# Patient Record
Sex: FEMALE | Race: WHITE | Employment: OTHER | URBAN - NONMETROPOLITAN AREA
[De-identification: names, ages, dates, MRNs, and addresses within clinical notes are randomized per-mention and may not be internally consistent; named-entity substitution may affect disease eponyms.]

---

## 2017-08-22 ENCOUNTER — HOSPITAL ENCOUNTER (EMERGENCY)
Facility: HOSPITAL | Age: 52
Discharge: HOME | End: 2017-08-22
Attending: PHYSICIAN ASSISTANT | Admitting: PHYSICIAN ASSISTANT
Payer: COMMERCIAL

## 2017-08-22 VITALS
OXYGEN SATURATION: 100 % | SYSTOLIC BLOOD PRESSURE: 139 MMHG | DIASTOLIC BLOOD PRESSURE: 82 MMHG | RESPIRATION RATE: 20 BRPM | TEMPERATURE: 98 F | HEART RATE: 78 BPM

## 2017-08-22 DIAGNOSIS — T74.21XA SEXUAL ASSAULT OF ADULT, INITIAL ENCOUNTER: ICD-10-CM

## 2017-08-22 PROCEDURE — 25000128 H RX IP 250 OP 636: Performed by: PHYSICIAN ASSISTANT

## 2017-08-22 PROCEDURE — 99283 EMERGENCY DEPT VISIT LOW MDM: CPT | Performed by: PHYSICIAN ASSISTANT

## 2017-08-22 PROCEDURE — 25000125 ZZHC RX 250: Performed by: PHYSICIAN ASSISTANT

## 2017-08-22 PROCEDURE — 99284 EMERGENCY DEPT VISIT MOD MDM: CPT | Mod: 25

## 2017-08-22 PROCEDURE — 25000132 ZZH RX MED GY IP 250 OP 250 PS 637: Performed by: PHYSICIAN ASSISTANT

## 2017-08-22 PROCEDURE — 96372 THER/PROPH/DIAG INJ SC/IM: CPT

## 2017-08-22 RX ORDER — METRONIDAZOLE 500 MG/1
2000 TABLET ORAL ONCE
Status: COMPLETED | OUTPATIENT
Start: 2017-08-22 | End: 2017-08-22

## 2017-08-22 RX ORDER — AZITHROMYCIN 250 MG/1
1000 TABLET, FILM COATED ORAL ONCE
Status: COMPLETED | OUTPATIENT
Start: 2017-08-22 | End: 2017-08-22

## 2017-08-22 RX ORDER — CEFTRIAXONE SODIUM 1 G
250 VIAL (EA) INJECTION ONCE
Status: COMPLETED | OUTPATIENT
Start: 2017-08-22 | End: 2017-08-22

## 2017-08-22 RX ORDER — ONDANSETRON 4 MG/1
4 TABLET, ORALLY DISINTEGRATING ORAL ONCE
Status: COMPLETED | OUTPATIENT
Start: 2017-08-22 | End: 2017-08-22

## 2017-08-22 RX ADMIN — AZITHROMYCIN 1000 MG: 250 TABLET, FILM COATED ORAL at 13:51

## 2017-08-22 RX ADMIN — METRONIDAZOLE 2000 MG: 500 TABLET, FILM COATED ORAL at 13:48

## 2017-08-22 RX ADMIN — ONDANSETRON 4 MG: 4 TABLET, ORALLY DISINTEGRATING ORAL at 13:27

## 2017-08-22 RX ADMIN — CEFTRIAXONE SODIUM 250 MG: 1 INJECTION, POWDER, FOR SOLUTION INTRAMUSCULAR; INTRAVENOUS at 13:36
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
## 2017-08-22 NOTE — ED AVS SNAPSHOT
[facility]    [address] [address]    Phone:  [phone]                                       Josephine Liu   MRN: [id number]    Department[facility]  [facility]   Date of Visit:  8/22/2017           After Visit Summary Signature Page     I have received my discharge instructions, and my questions have been answered. I have discussed any challenges I see with this plan with the nurse or doctor.    ..........................................................................................................................................  Patient/Patient Representative Signature      ..........................................................................................................................................  Patient Representative Print Name and Relationship to Patient    ..................................................               ................................................  Date                                            Time    ..........................................................................................................................................  Reviewed by Signature/Title    ...................................................              ..............................................  Date                                                            Time
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
## 2017-08-23 NOTE — ED NOTES
Patient called at 1330 today with questions regarding the discharge medications she was given yesterday and requesting zofran 4 mg ODT. Contacted Avril Dominguez for questions on her medication. Mason Puckett called a prescription in to [facility] [place] as pt requested. Zofran ODT 4mg; one tab every 6 hours prn for nausea. Total of 15 tabs.MARA JENKINS. Patient contacted to inform med was ordered for her and would be ready in 20 minutes. Pt instructed to not drink ETOH for 72 hours after taking the flagyl per instructions from Arti, nicholas.

## 2018-02-11 ENCOUNTER — HOSPITAL ENCOUNTER (EMERGENCY)
Facility: HOSPITAL | Age: 53
Discharge: HOME | End: 2018-02-11
Attending: NURSE PRACTITIONER | Admitting: NURSE PRACTITIONER
Payer: COMMERCIAL

## 2018-02-11 VITALS
RESPIRATION RATE: 20 BRPM | SYSTOLIC BLOOD PRESSURE: 147 MMHG | DIASTOLIC BLOOD PRESSURE: 84 MMHG | OXYGEN SATURATION: 99 % | HEART RATE: 95 BPM | TEMPERATURE: 99.9 F

## 2018-02-11 DIAGNOSIS — R07.0 THROAT PAIN: ICD-10-CM

## 2018-02-11 DIAGNOSIS — J10.1 INFLUENZA A: ICD-10-CM

## 2018-02-11 LAB
DEPRECATED S PYO AG THROAT QL EIA: NORMAL
FLUAV+FLUBV AG SPEC QL: NEGATIVE
FLUAV+FLUBV AG SPEC QL: POSITIVE
SPECIMEN SOURCE: ABNORMAL
SPECIMEN SOURCE: NORMAL

## 2018-02-11 PROCEDURE — 87081 CULTURE SCREEN ONLY: CPT | Performed by: NURSE PRACTITIONER

## 2018-02-11 PROCEDURE — G0463 HOSPITAL OUTPT CLINIC VISIT: HCPCS

## 2018-02-11 PROCEDURE — 99203 OFFICE O/P NEW LOW 30 MIN: CPT | Performed by: NURSE PRACTITIONER

## 2018-02-11 PROCEDURE — 87804 INFLUENZA ASSAY W/OPTIC: CPT | Performed by: FAMILY MEDICINE

## 2018-02-11 PROCEDURE — 87880 STREP A ASSAY W/OPTIC: CPT | Performed by: NURSE PRACTITIONER

## 2018-02-11 RX ORDER — LORATADINE 10 MG/1
10 TABLET ORAL DAILY
COMMUNITY
End: 2024-06-12

## 2018-02-11 RX ORDER — AMOXICILLIN 500 MG/1
500 CAPSULE ORAL 2 TIMES DAILY
Qty: 20 CAPSULE | Refills: 0 | Status: SHIPPED | OUTPATIENT
Start: 2018-02-11 | End: 2018-02-21

## 2018-02-11 RX ORDER — OSELTAMIVIR PHOSPHATE 75 MG/1
75 CAPSULE ORAL 2 TIMES DAILY
Qty: 10 CAPSULE | Refills: 0 | Status: SHIPPED | OUTPATIENT
Start: 2018-02-11 | End: 2018-02-16

## 2018-02-11 NOTE — ED AVS SNAPSHOT
[facility]    [address] [address]    Phone:  [phone]                                       Josephine Liu   MRN: [id number]    Department:  [facility]   Date of Visit:  2/11/2018           Patient Information     Date Of Birth          1965        Your diagnoses for this visit were:     Influenza A     Throat pain        You were seen by Zulma Schmitz NP.      Follow-up Information     Follow up with Babs Davila NP.    Specialty:  Nurse Practitioner    Why:  As needed, If symptoms worsen    Contact information:    [facility]  [address] [facility] [address] [phone]          Follow up with [facility].    Specialty:  EMERGENCY MEDICINE    Why:  As needed, If symptoms worsen    Contact information:    [address] [address] [phone]    Additional information:    From [place] Area: Take [address]. Turn left at [address]. Turn left at the first stoplight on [address]. At the first stop sign, take a right onto [address]. Take a left into the parking lot and continue through until you reach the North enterance of the building.       From [place]: Take [address]. Take the [address] ramp towards [place]. Turn left onto [address]. Take a slight right onto [address]. Turn left at the first stoplight on [address]. At the first stop sign, take a right onto [address]. Take a left into the parking lot and continue through until you reach the North enterance of the building.       From [place]: Take [address]. Take a right at [address]. At the first stop sign, take a right onto [address]. Take a left into the parking lot and continue through until you reach the North enterance of the building.         Discharge Instructions       Take antibiotics as directed.   Eat a yogurt daily while taking  antibiotics.   Take tamiflu as ordered.   Take tylenol and/or ibuprofen for pain or fever. Follow dosing on package.   Increase fluid intake.   Throw tooth brush out in 5 days.   Follow up with PCP with any increase in symptoms or concerns.   Return to urgent care or emergency department with any increase in symptoms or concerns.     Discharge References/Attachments     (ADULT), INFLUENZA (ENGLISH)    SORE THROATS, SELF-CARE FOR (ENGLISH)    SORE THROAT, WHEN YOU HAVE A (ENGLISH)    PHARYNGITIS, STREP (PRESUMED) (ENGLISH)         Review of your medicines      START taking        Dose / Directions Last dose taken    amoxicillin 500 MG capsule   Commonly known as:  AMOXIL   Dose:  500 mg   Quantity:  20 capsule        Take 1 capsule (500 mg) by mouth 2 times daily for 10 days   Refills:  0        oseltamivir 75 MG capsule   Commonly known as:  TAMIFLU   Dose:  75 mg   Quantity:  10 capsule        Take 1 capsule (75 mg) by mouth 2 times daily for 5 days   Refills:  0          Our records show that you are taking the medicines listed below. If these are incorrect, please call your family doctor or clinic.        Dose / Directions Last dose taken    biotin 2.5 mg/mL Susp   Dose:  5000 mg        Take 5,000 mg by mouth daily   Refills:  0        budesonide 32 MCG/ACT spray   Commonly known as:  RINOCORT AQUA   Dose:  1 spray        Spray 1 spray into both nostrils daily   Refills:  0        cyclobenzaprine 10 MG tablet   Commonly known as:  FLEXERIL   Dose:  10 mg   Quantity:  3 tablet        Take 1 tablet (10 mg) by mouth 3 times daily as needed for muscle spasms   Refills:  0        CYMBALTA PO   Dose:  60 mg        Take 60 mg by mouth 2 times daily   Refills:  0        FUROSEMIDE PO   Dose:  20 mg        Take 20 mg by mouth daily as needed   Refills:  0        LAMOTRIGINE PO   Dose:  25 mg        Take 25 mg by mouth 2 times daily   Refills:  0        LEVOTHYROXINE SODIUM PO   Dose:  150 mcg        Take 150 mcg by mouth  daily   Refills:  0        loratadine 10 MG tablet   Commonly known as:  CLARITIN   Dose:  10 mg        Take 10 mg by mouth daily   Refills:  0        MAGNESIUM PO   Dose:  1 tablet        Take 1 tablet by mouth daily   Refills:  0        MINOCYCLINE HCL PO   Dose:  100 mg        Take 100 mg by mouth daily   Refills:  0        ROPINIROLE HCL PO   Dose:  0.25 mg        Take 0.25 mg by mouth At Bedtime   Refills:  0        TRAZODONE HCL PO   Dose:  100 mg        Take 100 mg by mouth At Bedtime   Refills:  0        VITAMIN D (CHOLECALCIFEROL) PO   Dose:  56261 Units        Take 10,000 Units by mouth daily   Refills:  0                Prescriptions were sent or printed at these locations (2 Prescriptions)                   [facility] [id number] - [place], [place] - [address] AT [facility] [address]   [address], [place] [address]    Telephone:  [phone]   Fax:  [phone]   Hours:                  E-Prescribed (2 of 2)         oseltamivir (TAMIFLU) 75 MG capsule               amoxicillin (AMOXIL) 500 MG capsule                Procedures and tests performed during your visit     Beta strep group A culture    Influenza A/B antigen    Rapid strep screen      Orders Needing Specimen Collection     None      Pending Results     Date and Time Order Name Status Description    2/11/2018 1511 Beta strep group A culture In process             Pending Culture Results     Date and Time Order Name Status Description    2/11/2018 1511 Beta strep group A culture In process             Thank you for choosing [facility]       Thank you for choosing [facility] for your care. Our goal is always to provide you with excellent care. Hearing back from our patients is one way we can continue to improve our services. Please take a few minutes to complete the written survey that you may receive in the mail after you visit with us. Thank you!        [employer] Information     [employer] lets you send messages to your doctor, view your  "test results, renew your prescriptions, schedule appointments and more. To sign up, go to [email or web address] . Click on \"Log in\" on the left side of the screen, which will take you to the Welcome page. Then click on \"Sign up Now\" on the right side of the page.     You will be asked to enter the access code listed below, as well as some personal information. Please follow the directions to create your username and password.     Your access code is: [email or web address]  Expires: 2018  3:35 PM     Your access code will  in 90 days. If you need help or a new code, please call your [facility] clinic or [phone].        Care EveryWhere ID     This is your Care EveryWhere ID. This could be used by other organizations to access your [facility] medical records  [id number]        Equal Access to Services     [facility] Hospital[facility] : Hadestrella Taylor, lisa yoo, casey corbettalshellie bernard, austyn plummer . So [facility] [phone].    ATENCIÓN: Si habla español, tiene a handy disposición servicios gratuitos de asistencia lingüística. Llame al [phone].    We comply with applicable federal civil rights laws and [place] laws. We do not discriminate on the basis of race, color, national origin, age, disability, sex, sexual orientation, or gender identity.            After Visit Summary       This is your record. Keep this with you and show to your community pharmacist(s) and doctor(s) at your next visit.                  "

## 2018-02-11 NOTE — ED NOTES
Pt given verbal and written d/c instructions and pt verbalizes understanding. Pt advised that prescriptions for amoxicillin and tamiflu have been sent to [facility]. Pt left department ambulatory.

## 2018-02-11 NOTE — ED PROVIDER NOTES
History     Chief Complaint   Patient presents with     Flu Symptoms     The history is provided by the patient. No  was used.     Josephine Liu is a 53 year old female who presents with a sore throat, fever, chills, cough, and fatigue that started 1 day ago. She's taken benadryl, ibuprofen, and nasal spray with mild effectiveness. Drinking well. Decreased appetite. Bowel and bladder are working well. No antibiotic use in the past 30 days. She is a non tobacco user.     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     Problem List:    Patient Active Problem List    Diagnosis Date Noted     ACP (advance care planning) 05/20/2015     Priority: Medium     Advance Care Planning 5/20/2015: Receipt of ACP document:  Received: Health Care Directive which was witnessed or notarized on 2-13-15.  Document previously scanned on 3-18-15.  Validation form completed and sent to be scanned.  Code Status needs to be updated to reflect choices in most recent ACP document.  Confirmed/documented designated decision maker(s).  Added by Mar Sims RN, System ACP Coordinator Honoring Choices              Gastric bypass status for obesity      Priority: Medium        Past Medical History:    Past Medical History:   Diagnosis Date     Gastric bypass status for obesity        Past Surgical History:    History reviewed. No pertinent surgical history.    Family History:    No family history on file.    Social History:  Marital Status:   [2]  Social History   Substance Use Topics     Smoking status: Never Smoker     Smokeless tobacco: Never Used     Alcohol use Yes      Comment: weekends        Medications:      loratadine (CLARITIN) 10 MG tablet   MAGNESIUM PO   FUROSEMIDE PO   ROPINIROLE HCL PO   oseltamivir (TAMIFLU) 75 MG capsule   amoxicillin (AMOXIL) 500 MG capsule   MINOCYCLINE HCL PO   LEVOTHYROXINE SODIUM PO   LAMOTRIGINE PO   DULoxetine HCl (CYMBALTA PO)   TRAZODONE HCL PO   budesonide  (RINOCORT AQUA) 32 MCG/ACT nasal spray   VITAMIN D, CHOLECALCIFEROL, PO   biotin 2.5 mg/mL   cyclobenzaprine (FLEXERIL) 10 MG tablet         Review of Systems   Constitutional: Positive for appetite change, chills, fatigue and fever. Negative for activity change.   HENT: Positive for congestion and sore throat. Negative for ear discharge, ear pain, postnasal drip, rhinorrhea, sinus pain, sinus pressure, trouble swallowing and voice change.    Respiratory: Positive for cough. Negative for shortness of breath, wheezing and stridor.    Cardiovascular: Negative for chest pain.   Gastrointestinal: Negative for abdominal pain, diarrhea, nausea and vomiting.   Genitourinary: Negative for dysuria.   Skin: Negative for rash.   Neurological: Negative for dizziness, weakness, numbness and headaches.   Psychiatric/Behavioral: Negative.        Physical Exam   BP: 147/84  Pulse: 95  Temp: 99.9  F (37.7  C)  Resp: 20  SpO2: 99 %      Physical Exam   Constitutional: She is oriented to person, place, and time. She appears well-developed and well-nourished. No distress.   HENT:   Head: Normocephalic.   Right Ear: External ear normal.   Left Ear: External ear normal.   Mouth/Throat: Oropharyngeal exudate present.   Oropharynx with erythema and exudate. Tonsils enlarged +2.    Neck: Normal range of motion. Neck supple.   Cardiovascular: Normal rate, regular rhythm and normal heart sounds.    No murmur heard.  Pulmonary/Chest: Effort normal. No respiratory distress. She has no wheezes. She has no rales.   Abdominal: Soft. She exhibits no distension.   Musculoskeletal: Normal range of motion.   Lymphadenopathy:     She has cervical adenopathy.   Neurological: She is alert and oriented to person, place, and time. She exhibits normal muscle tone.   Skin: Skin is warm. No rash noted. She is not diaphoretic. No erythema.   Psychiatric: She has a normal mood and affect. Her behavior is normal.   Nursing note and vitals reviewed.      ED  Course     ED Course     Procedures    Labs Ordered and Resulted from Time of ED Arrival Up to the Time of Departure from the ED   INFLUENZA A/B ANTIGEN - Abnormal; Notable for the following:        Result Value    Influenza A Positive (*)     All other components within normal limits   RAPID STREP SCREEN   BETA STREP GROUP A CULTURE     Results for orders placed or performed during the hospital encounter of 02/11/18   Influenza A/B antigen   Result Value Ref Range    Influenza A/B Agn Specimen Nares     Influenza A Positive (A) NEG^Negative    Influenza B Negative NEG^Negative   Rapid strep screen   Result Value Ref Range    Specimen Description Throat     Rapid Strep A Screen       NEGATIVE: No Group A streptococcal antigen detected by immunoassay, await culture report.       Assessments & Plan (with Medical Decision Making)     Discussed plan of care. She verbalized understanding. All questions answered.     I have reviewed the nursing notes.    I have reviewed the findings, diagnosis, plan and need for follow up with the patient.  Discharged in stable condition.     New Prescriptions    AMOXICILLIN (AMOXIL) 500 MG CAPSULE    Take 1 capsule (500 mg) by mouth 2 times daily for 10 days    OSELTAMIVIR (TAMIFLU) 75 MG CAPSULE    Take 1 capsule (75 mg) by mouth 2 times daily for 5 days       Final diagnoses:   Influenza A   Throat pain     Take antibiotics as directed.   Eat a yogurt daily while taking antibiotics.   Take tamiflu as ordered.   Take tylenol and/or ibuprofen for pain or fever. Follow dosing on package.   Increase fluid intake.   Throw tooth brush out in 5 days.   Follow up with PCP with any increase in symptoms or concerns.   Return to urgent care or emergency department with any increase in symptoms or concerns.     MARCIE Wilkinson  2/11/2018  2:45 PM  [facility]       Zulma Schmitz NP  02/15/18 2012

## 2018-02-11 NOTE — ED NOTES
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 Pt notes respiratory symptoms started yesterday and fever started today.

## 2018-02-11 NOTE — ED NOTES
DATE:  2/11/2018   TIME OF RECEIPT FROM LAB:  4070  LAB TEST:  Influenza A   LAB VALUE:  Positive  RESULTS GIVEN WITH READ-BACK TO (PROVIDER):  Zulma Schmitz   TIME LAB VALUE REPORTED TO PROVIDER:   0176

## 2018-02-11 NOTE — DISCHARGE INSTRUCTIONS
Take antibiotics as directed.   Eat a yogurt daily while taking antibiotics.   Take tamiflu as ordered.   Take tylenol and/or ibuprofen for pain or fever. Follow dosing on package.   Increase fluid intake.   Throw tooth brush out in 5 days.   Follow up with PCP with any increase in symptoms or concerns.   Return to urgent care or emergency department with any increase in symptoms or concerns.

## 2018-02-11 NOTE — ED AVS SNAPSHOT
[facility]    [address] [address]    Phone:  [phone]                                       Josephine Liu   MRN: [id number]    Department[facility]  [facility]   Date of Visit:  2/11/2018           After Visit Summary Signature Page     I have received my discharge instructions, and my questions have been answered. I have discussed any challenges I see with this plan with the nurse or doctor.    ..........................................................................................................................................  Patient/Patient Representative Signature      ..........................................................................................................................................  Patient Representative Print Name and Relationship to Patient    ..................................................               ................................................  Date                                            Time    ..........................................................................................................................................  Reviewed by Signature/Title    ...................................................              ..............................................  Date                                                            Time

## 2018-02-13 LAB
BACTERIA SPEC CULT: NORMAL
SPECIMEN SOURCE: NORMAL

## 2018-02-15 ASSESSMENT — ENCOUNTER SYMPTOMS
NUMBNESS: 0
DIARRHEA: 0
WHEEZING: 0
SINUS PRESSURE: 0
WEAKNESS: 0
RHINORRHEA: 0
CHILLS: 1
FATIGUE: 1
TROUBLE SWALLOWING: 0
STRIDOR: 0
PSYCHIATRIC NEGATIVE: 1
SINUS PAIN: 0
APPETITE CHANGE: 1
SORE THROAT: 1
DYSURIA: 0
FEVER: 1
ABDOMINAL PAIN: 0
DIZZINESS: 0
ACTIVITY CHANGE: 0
VOMITING: 0
VOICE CHANGE: 0
SHORTNESS OF BREATH: 0
NAUSEA: 0
HEADACHES: 0
COUGH: 1

## 2018-04-18 NOTE — ED NOTES
Discharge instructions given to pt and .  Pt sent home with flagyl 2 gms. See note in MRI.  Pt ambulatory   no dysuria, no frequency, and no hematuria.

## 2020-02-05 ENCOUNTER — APPOINTMENT (OUTPATIENT)
Dept: OCCUPATIONAL MEDICINE | Facility: OTHER | Age: 55
End: 2020-02-05

## 2020-02-05 ENCOUNTER — OFFICE VISIT (OUTPATIENT)
Dept: FAMILY MEDICINE | Facility: OTHER | Age: 55
End: 2020-02-05

## 2020-02-05 DIAGNOSIS — Z53.9 ERRONEOUS ENCOUNTER--DISREGARD: Primary | ICD-10-CM

## 2020-02-05 PROCEDURE — 99499 UNLISTED E&M SERVICE: CPT

## 2020-02-05 PROCEDURE — 90746 HEPB VACCINE 3 DOSE ADULT IM: CPT

## 2020-02-17 ENCOUNTER — OFFICE VISIT (OUTPATIENT)
Dept: CHIROPRACTIC MEDICINE | Facility: OTHER | Age: 55
End: 2020-02-17
Attending: CHIROPRACTOR
Payer: COMMERCIAL

## 2020-02-17 DIAGNOSIS — M54.50 ACUTE BILATERAL LOW BACK PAIN WITHOUT SCIATICA: ICD-10-CM

## 2020-02-17 DIAGNOSIS — M99.02 SEGMENTAL AND SOMATIC DYSFUNCTION OF THORACIC REGION: ICD-10-CM

## 2020-02-17 DIAGNOSIS — M99.03 SEGMENTAL AND SOMATIC DYSFUNCTION OF LUMBAR REGION: Primary | ICD-10-CM

## 2020-02-17 PROCEDURE — 98940 CHIROPRACT MANJ 1-2 REGIONS: CPT | Mod: AT | Performed by: CHIROPRACTOR

## 2020-02-17 PROCEDURE — 99202 OFFICE O/P NEW SF 15 MIN: CPT | Mod: 25 | Performed by: CHIROPRACTOR

## 2020-02-17 NOTE — PROGRESS NOTES
Subjective Finding:    Chief compalint: Patient presents with:  Back Pain  , Pain Scale: 5/10, Intensity: dull, Duration: 1 weeks, Radiating: no.    Date of injury:    Activities that the pain restricts:   Home/household/hobbies/social activities: yes.  Work duties: yes.  Sleep: yes.  Makes symptoms better: rest.  Makes symptoms worse: activity.  Have you seen anyone else for the symptoms? no.  Work related: no.  Automobile related injury: no.    Objective and Assessment:    Posture Analysis:   High shoulder: left.  Head tilt: left.  High iliac crest: .  Head carriage: forward.  Thoracic Kyphosis: neutral.  Lumbar Lordosis: neutral.    Lumbar Range of Motion: flexion decreased, extension decreased, left lateral flexion decreased and right lateral flexion decreased.  Cervical Range of Motion: extension decreased, left lateral flexion decreased and left rotation decreased.  Thoracic Range of Motion: extension decreased.  Extremity Range of Motion: .    Palpation:   L spine tightness    Segmental dysfunction pre-treatment and treatment area: T6      L5    Assessment post-treatment:  Cervical: ROM increased.  Thoracic: ROM increased.  Lumbar: ROM increased.    Comments:   Complicating Factors: .    Plan / Procedure:    Treatment plan: PRN  Instructed patient: stretch.  Short term goals: reduce pain, increase ROM and increase joint flexibility.  Long term goals: restore normal function.  Prognosis: very good.

## 2020-02-20 ENCOUNTER — OFFICE VISIT (OUTPATIENT)
Dept: CHIROPRACTIC MEDICINE | Facility: OTHER | Age: 55
End: 2020-02-20
Attending: CHIROPRACTOR
Payer: COMMERCIAL

## 2020-02-20 DIAGNOSIS — M54.50 ACUTE BILATERAL LOW BACK PAIN WITHOUT SCIATICA: ICD-10-CM

## 2020-02-20 DIAGNOSIS — M99.03 SEGMENTAL AND SOMATIC DYSFUNCTION OF LUMBAR REGION: Primary | ICD-10-CM

## 2020-02-20 DIAGNOSIS — M99.02 SEGMENTAL AND SOMATIC DYSFUNCTION OF THORACIC REGION: ICD-10-CM

## 2020-02-20 PROCEDURE — 98940 CHIROPRACT MANJ 1-2 REGIONS: CPT | Mod: AT | Performed by: CHIROPRACTOR

## 2020-02-24 ENCOUNTER — OFFICE VISIT (OUTPATIENT)
Dept: CHIROPRACTIC MEDICINE | Facility: OTHER | Age: 55
End: 2020-02-24
Attending: CHIROPRACTOR
Payer: COMMERCIAL

## 2020-02-24 DIAGNOSIS — M54.50 ACUTE BILATERAL LOW BACK PAIN WITHOUT SCIATICA: ICD-10-CM

## 2020-02-24 DIAGNOSIS — M99.03 SEGMENTAL AND SOMATIC DYSFUNCTION OF LUMBAR REGION: Primary | ICD-10-CM

## 2020-02-24 DIAGNOSIS — M99.02 SEGMENTAL AND SOMATIC DYSFUNCTION OF THORACIC REGION: ICD-10-CM

## 2020-02-24 PROCEDURE — 98940 CHIROPRACT MANJ 1-2 REGIONS: CPT | Mod: AT | Performed by: CHIROPRACTOR

## 2020-02-27 ENCOUNTER — OFFICE VISIT (OUTPATIENT)
Dept: CHIROPRACTIC MEDICINE | Facility: OTHER | Age: 55
End: 2020-02-27
Attending: CHIROPRACTOR
Payer: COMMERCIAL

## 2020-02-27 DIAGNOSIS — M99.03 SEGMENTAL AND SOMATIC DYSFUNCTION OF LUMBAR REGION: Primary | ICD-10-CM

## 2020-02-27 DIAGNOSIS — M99.02 SEGMENTAL AND SOMATIC DYSFUNCTION OF THORACIC REGION: ICD-10-CM

## 2020-02-27 DIAGNOSIS — M54.50 ACUTE BILATERAL LOW BACK PAIN WITHOUT SCIATICA: ICD-10-CM

## 2020-02-27 PROCEDURE — 98940 CHIROPRACT MANJ 1-2 REGIONS: CPT | Mod: AT | Performed by: CHIROPRACTOR

## 2020-03-11 ENCOUNTER — APPOINTMENT (OUTPATIENT)
Dept: OCCUPATIONAL MEDICINE | Facility: OTHER | Age: 55
End: 2020-03-11

## 2020-03-11 PROCEDURE — 90746 HEPB VACCINE 3 DOSE ADULT IM: CPT

## 2020-08-05 ENCOUNTER — APPOINTMENT (OUTPATIENT)
Dept: OCCUPATIONAL MEDICINE | Facility: OTHER | Age: 55
End: 2020-08-05

## 2020-08-05 PROCEDURE — 90746 HEPB VACCINE 3 DOSE ADULT IM: CPT

## 2021-02-23 ENCOUNTER — OFFICE VISIT (OUTPATIENT)
Dept: CHIROPRACTIC MEDICINE | Facility: OTHER | Age: 56
End: 2021-02-23
Attending: CHIROPRACTOR
Payer: COMMERCIAL

## 2021-02-23 DIAGNOSIS — M99.03 SEGMENTAL AND SOMATIC DYSFUNCTION OF LUMBAR REGION: Primary | ICD-10-CM

## 2021-02-23 DIAGNOSIS — M54.50 ACUTE BILATERAL LOW BACK PAIN WITHOUT SCIATICA: ICD-10-CM

## 2021-02-23 DIAGNOSIS — M99.02 SEGMENTAL AND SOMATIC DYSFUNCTION OF THORACIC REGION: ICD-10-CM

## 2021-02-23 PROCEDURE — 98940 CHIROPRACT MANJ 1-2 REGIONS: CPT | Mod: AT | Performed by: CHIROPRACTOR

## 2021-02-23 PROCEDURE — 99212 OFFICE O/P EST SF 10 MIN: CPT | Mod: 25 | Performed by: CHIROPRACTOR

## 2021-02-25 NOTE — PROGRESS NOTES
Subjective Finding:    Chief compalint: Patient presents with:  Back Pain  , Pain Scale: 5/10, Intensity: dull, Duration: 1 weeks, Radiating: no.    Date of injury:    Activities that the pain restricts:   Home/household/hobbies/social activities: yes.  Work duties: yes.  Sleep: yes.  Makes symptoms better: rest.  Makes symptoms worse: activity.  Have you seen anyone else for the symptoms? no.  Work related: no.  Automobile related injury: no.    Objective and Assessment:    Posture Analysis:   High shoulder: left.  Head tilt: left.  High iliac crest: .  Head carriage: forward.  Thoracic Kyphosis: neutral.  Lumbar Lordosis: neutral.    Lumbar Range of Motion: flexion decreased, extension decreased, left lateral flexion decreased and right lateral flexion decreased.  Cervical Range of Motion: extension decreased, left lateral flexion decreased and left rotation decreased.  Thoracic Range of Motion: extension decreased.  Extremity Range of Motion: .    Palpation:   L spine tightness    Segmental dysfunction pre-treatment and treatment area: T6      L5    Assessment post-treatment:  Cervical: ROM increased.  Thoracic: ROM increased.  Lumbar: ROM increased.    Comments:   Complicating Factors: .X ray reivewed with patient    Plan / Procedure:    Treatment plan: PRN  Instructed patient: stretch.  Short term goals: reduce pain, increase ROM and increase joint flexibility.  Long term goals: restore normal function.  Prognosis: very good.

## 2021-03-10 ENCOUNTER — OFFICE VISIT (OUTPATIENT)
Dept: CHIROPRACTIC MEDICINE | Facility: OTHER | Age: 56
End: 2021-03-10
Attending: CHIROPRACTOR
Payer: COMMERCIAL

## 2021-03-10 DIAGNOSIS — M54.50 ACUTE BILATERAL LOW BACK PAIN WITHOUT SCIATICA: ICD-10-CM

## 2021-03-10 DIAGNOSIS — M99.03 SEGMENTAL AND SOMATIC DYSFUNCTION OF LUMBAR REGION: Primary | ICD-10-CM

## 2021-03-10 DIAGNOSIS — M99.02 SEGMENTAL AND SOMATIC DYSFUNCTION OF THORACIC REGION: ICD-10-CM

## 2021-03-10 PROCEDURE — 98940 CHIROPRACT MANJ 1-2 REGIONS: CPT | Mod: AT | Performed by: CHIROPRACTOR

## 2021-03-15 ENCOUNTER — OFFICE VISIT (OUTPATIENT)
Dept: CHIROPRACTIC MEDICINE | Facility: OTHER | Age: 56
End: 2021-03-15
Attending: CHIROPRACTOR
Payer: COMMERCIAL

## 2021-03-15 DIAGNOSIS — M99.02 SEGMENTAL AND SOMATIC DYSFUNCTION OF THORACIC REGION: ICD-10-CM

## 2021-03-15 DIAGNOSIS — M99.03 SEGMENTAL AND SOMATIC DYSFUNCTION OF LUMBAR REGION: Primary | ICD-10-CM

## 2021-03-15 DIAGNOSIS — M54.50 ACUTE BILATERAL LOW BACK PAIN WITHOUT SCIATICA: ICD-10-CM

## 2021-03-15 PROCEDURE — 98940 CHIROPRACT MANJ 1-2 REGIONS: CPT | Mod: AT | Performed by: CHIROPRACTOR

## 2021-08-04 ENCOUNTER — OFFICE VISIT (OUTPATIENT)
Dept: CHIROPRACTIC MEDICINE | Facility: OTHER | Age: 56
End: 2021-08-04
Attending: CHIROPRACTOR
Payer: COMMERCIAL

## 2021-08-04 DIAGNOSIS — M99.01 SEGMENTAL AND SOMATIC DYSFUNCTION OF CERVICAL REGION: ICD-10-CM

## 2021-08-04 DIAGNOSIS — M99.03 SEGMENTAL AND SOMATIC DYSFUNCTION OF LUMBAR REGION: Primary | ICD-10-CM

## 2021-08-04 DIAGNOSIS — M99.02 SEGMENTAL AND SOMATIC DYSFUNCTION OF THORACIC REGION: ICD-10-CM

## 2021-08-04 DIAGNOSIS — M54.50 ACUTE BILATERAL LOW BACK PAIN WITHOUT SCIATICA: ICD-10-CM

## 2021-08-04 PROCEDURE — 98941 CHIROPRACT MANJ 3-4 REGIONS: CPT | Mod: AT | Performed by: CHIROPRACTOR

## 2021-08-09 NOTE — PROGRESS NOTES
Subjective Finding:    Chief compalint: Patient presents with:  Back Pain  Neck Pain  , Pain Scale: 5/10, Intensity: dull, Duration: 1 weeks, Radiating: no.    Date of injury:    Activities that the pain restricts:   Home/household/hobbies/social activities: yes.  Work duties: yes.  Sleep: yes.  Makes symptoms better: rest.  Makes symptoms worse: activity.  Have you seen anyone else for the symptoms? no.  Work related: no.  Automobile related injury: no.    Objective and Assessment:    Posture Analysis:   High shoulder: left.  Head tilt: left.  High iliac crest: .  Head carriage: forward.  Thoracic Kyphosis: neutral.  Lumbar Lordosis: neutral.    Lumbar Range of Motion: flexion decreased, extension decreased, left lateral flexion decreased and right lateral flexion decreased.  Cervical Range of Motion: extension decreased, left lateral flexion decreased and left rotation decreased.  Thoracic Range of Motion: extension decreased.  Extremity Range of Motion: .    Palpation:   L spine tightness    Segmental dysfunction pre-treatment and treatment area: T6      L5    Assessment post-treatment:  Cervical: ROM increased.  Thoracic: ROM increased.  Lumbar: ROM increased.    Comments:   Complicating Factors: .X ray reivewed with patient    Plan / Procedure:    Treatment plan: PRN  Instructed patient: stretch.  Short term goals: reduce pain, increase ROM and increase joint flexibility.  Long term goals: restore normal function.  Prognosis: very good.

## 2021-08-16 ENCOUNTER — OFFICE VISIT (OUTPATIENT)
Dept: CHIROPRACTIC MEDICINE | Facility: OTHER | Age: 56
End: 2021-08-16
Attending: CHIROPRACTOR
Payer: COMMERCIAL

## 2021-08-16 DIAGNOSIS — M99.03 SEGMENTAL AND SOMATIC DYSFUNCTION OF LUMBAR REGION: Primary | ICD-10-CM

## 2021-08-16 DIAGNOSIS — M99.02 SEGMENTAL AND SOMATIC DYSFUNCTION OF THORACIC REGION: ICD-10-CM

## 2021-08-16 DIAGNOSIS — M54.50 ACUTE BILATERAL LOW BACK PAIN WITHOUT SCIATICA: ICD-10-CM

## 2021-08-16 PROCEDURE — 98940 CHIROPRACT MANJ 1-2 REGIONS: CPT | Mod: AT | Performed by: CHIROPRACTOR

## 2021-08-19 ENCOUNTER — OFFICE VISIT (OUTPATIENT)
Dept: CHIROPRACTIC MEDICINE | Facility: OTHER | Age: 56
End: 2021-08-19
Attending: CHIROPRACTOR
Payer: COMMERCIAL

## 2021-08-19 DIAGNOSIS — M54.50 ACUTE BILATERAL LOW BACK PAIN WITHOUT SCIATICA: ICD-10-CM

## 2021-08-19 DIAGNOSIS — M99.03 SEGMENTAL AND SOMATIC DYSFUNCTION OF LUMBAR REGION: Primary | ICD-10-CM

## 2021-08-19 DIAGNOSIS — M99.02 SEGMENTAL AND SOMATIC DYSFUNCTION OF THORACIC REGION: ICD-10-CM

## 2021-08-19 PROCEDURE — 98940 CHIROPRACT MANJ 1-2 REGIONS: CPT | Mod: AT | Performed by: CHIROPRACTOR

## 2021-08-19 NOTE — PROGRESS NOTES
Subjective Finding:    Chief compalint: Patient presents with:  Back Pain  , Pain Scale: 5/10, Intensity: dull, Duration: 1 weeks, Radiating: no.    Date of injury:    Activities that the pain restricts:   Home/household/hobbies/social activities: yes.  Work duties: yes.  Sleep: yes.  Makes symptoms better: rest.  Makes symptoms worse: activity.  Have you seen anyone else for the symptoms? no.  Work related: no.  Automobile related injury: no.    Objective and Assessment:    Posture Analysis:   High shoulder: left.  Head tilt: left.  High iliac crest: .  Head carriage: forward.  Thoracic Kyphosis: neutral.  Lumbar Lordosis: neutral.    Lumbar Range of Motion: flexion decreased, extension decreased, left lateral flexion decreased and right lateral flexion decreased.  Cervical Range of Motion: extension decreased, left lateral flexion decreased and left rotation decreased.  Thoracic Range of Motion: extension decreased.  Extremity Range of Motion: .    Palpation:   L spine tightness    Segmental dysfunction pre-treatment and treatment area: T6      L5    Assessment post-treatment:  Cervical: ROM increased.  Thoracic: ROM increased.  Lumbar: ROM increased.    Comments:   Complicating Factors: .X ray reivewed with patient    Plan / Procedure:    Treatment plan: PRN  Instructed patient: stretch.  Short term goals: reduce pain, increase ROM and increase joint flexibility.  Long term goals: restore normal function.  Prognosis: very good.                                                                Medication Refill

## 2021-11-11 ENCOUNTER — HOSPITAL ENCOUNTER (EMERGENCY)
Facility: HOSPITAL | Age: 56
Discharge: HOME | End: 2021-11-11
Attending: NURSE PRACTITIONER | Admitting: NURSE PRACTITIONER
Payer: OTHER MISCELLANEOUS

## 2021-11-11 ENCOUNTER — APPOINTMENT (OUTPATIENT)
Dept: CT IMAGING | Facility: HOSPITAL | Age: 56
End: 2021-11-11
Attending: NURSE PRACTITIONER
Payer: OTHER MISCELLANEOUS

## 2021-11-11 ENCOUNTER — APPOINTMENT (OUTPATIENT)
Dept: GENERAL RADIOLOGY | Facility: HOSPITAL | Age: 56
End: 2021-11-11
Attending: NURSE PRACTITIONER
Payer: OTHER MISCELLANEOUS

## 2021-11-11 VITALS
TEMPERATURE: 98.5 F | HEART RATE: 79 BPM | SYSTOLIC BLOOD PRESSURE: 148 MMHG | RESPIRATION RATE: 12 BRPM | DIASTOLIC BLOOD PRESSURE: 100 MMHG | OXYGEN SATURATION: 97 %

## 2021-11-11 DIAGNOSIS — M54.2 CERVICALGIA: ICD-10-CM
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
## 2021-11-11 DIAGNOSIS — S06.0X0A CONCUSSION WITHOUT LOSS OF CONSCIOUSNESS, INITIAL ENCOUNTER: ICD-10-CM

## 2021-11-11 PROCEDURE — 70450 CT HEAD/BRAIN W/O DYE: CPT

## 2021-11-11 PROCEDURE — 72125 CT NECK SPINE W/O DYE: CPT

## 2021-11-11 PROCEDURE — G0463 HOSPITAL OUTPT CLINIC VISIT: HCPCS

## 2021-11-11 PROCEDURE — 99213 OFFICE O/P EST LOW 20 MIN: CPT | Performed by: NURSE PRACTITIONER

## 2021-11-11 PROCEDURE — 72100 X-RAY EXAM L-S SPINE 2/3 VWS: CPT

## 2021-11-11 PROCEDURE — 99284 EMERGENCY DEPT VISIT MOD MDM: CPT | Mod: 25

## 2021-11-11 NOTE — ED NOTES
Pt states she slipped while walking at 1045 on slushy/slippery side walk. Denies any LOC or blood thinner. C/o feeling hungry at this time.

## 2021-11-11 NOTE — ED PROVIDER NOTES
History     Chief Complaint   Patient presents with     Head Injury     Fall     HPI  Josephine Liu is a 56 year old female who presents ambulatory for evaluation of injuries sustained after falling on slippery ground and hitting her head on the cement. She denies loss of consciousness. She does have associated headache, lightheadedness, neck pain. Denies any chest, back, abdominal pain. No pain or injuries to extremities. No open areas.     Allergies:  Allergies   Allergen Reactions     Altace [Ramipril]      Nuvigil [Armodafinil]        Problem List:    Patient Active Problem List    Diagnosis Date Noted     ACP (advance care planning) 05/20/2015     Priority: Medium     Advance Care Planning 5/20/2015: Receipt of ACP document:  Received: Health Care Directive which was witnessed or notarized on 2-13-15.  Document previously scanned on 3-18-15.  Validation form completed and sent to be scanned.  Code Status needs to be updated to reflect choices in most recent ACP document.  Confirmed/documented designated decision maker(s).  Added by Mar Sims RN, System ACP Coordinator [employer]oring Choices              Gastric bypass status for obesity      Priority: Medium        Past Medical History:    Past Medical History:   Diagnosis Date     Gastric bypass status for obesity        Past Surgical History:    No past surgical history on file.    Family History:    No family history on file.    Social History:  Marital Status:   [2]  Social History     Tobacco Use     Smoking status: Never Smoker     Smokeless tobacco: Never Used   Substance Use Topics     Alcohol use: Yes     Comment: weekends     Drug use: No        Medications:    biotin 2.5 mg/mL  budesonide (RINOCORT AQUA) 32 MCG/ACT nasal spray  cyclobenzaprine (FLEXERIL) 10 MG tablet  DULoxetine HCl (CYMBALTA PO)  FUROSEMIDE PO  LAMOTRIGINE PO  LEVOTHYROXINE SODIUM PO  loratadine (CLARITIN) 10 MG tablet  MAGNESIUM PO  MINOCYCLINE HCL PO  ROPINIROLE HCL  PO  TRAZODONE HCL PO  VITAMIN D, CHOLECALCIFEROL, PO          Review of Systems   HENT: Negative for congestion, ear discharge, ear pain and nosebleeds.    Respiratory: Negative for shortness of breath.    Cardiovascular: Negative for chest pain and palpitations.   Gastrointestinal: Negative for abdominal pain, nausea and vomiting.   Genitourinary: Negative for difficulty urinating.   Musculoskeletal: Positive for neck pain. Negative for arthralgias, back pain and myalgias.   Skin: Negative for color change, rash and wound.   Neurological: Positive for dizziness, light-headedness and headaches.       Physical Exam   BP: (!) 191/105 (left arm)  Pulse: 79  Temp: 98.5  F (36.9  C)  Resp: 18  SpO2: 97 %      Physical Exam  Constitutional:       General: She is not in acute distress.     Appearance: Normal appearance. She is not ill-appearing or toxic-appearing.   HENT:      Head: Normocephalic.      Right Ear: Tympanic membrane, ear canal and external ear normal.      Left Ear: Tympanic membrane, ear canal and external ear normal.      Nose: Nose normal.      Mouth/Throat:      Lips: Pink.      Mouth: Mucous membranes are moist.      Tongue: Tongue does not deviate from midline.      Pharynx: Oropharynx is clear. Uvula midline.   Eyes:      General: Lids are normal.      Extraocular Movements: Extraocular movements intact.      Conjunctiva/sclera: Conjunctivae normal.      Pupils: Pupils are equal, round, and reactive to light.   Cardiovascular:      Rate and Rhythm: Normal rate and regular rhythm.      Heart sounds: S1 normal and S2 normal. No murmur heard.  No friction rub. No gallop.    Pulmonary:      Effort: Pulmonary effort is normal.      Breath sounds: Normal breath sounds.   Chest:      Chest wall: No tenderness.   Abdominal:      Palpations: Abdomen is soft.      Tenderness: There is no abdominal tenderness. There is no guarding or rebound.   Musculoskeletal:      Cervical back: Spinous process tenderness and  muscular tenderness present.      Comments: FROM of upper and lower extremities   Skin:     General: Skin is warm and dry.      Coloration: Skin is not pale.   Neurological:      Mental Status: She is alert and oriented to person, place, and time.      GCS: GCS eye subscore is 4. GCS verbal subscore is 5. GCS motor subscore is 6.      Cranial Nerves: Cranial nerves are intact.      Sensory: Sensation is intact.      Motor: Motor function is intact. No weakness.      Coordination: Coordination is intact.      Gait: Gait is intact.   Psychiatric:         Speech: Speech normal.         Behavior: Behavior is cooperative.         ED Course        Procedures      No results found for this or any previous visit (from the past 24 hour(s)).    Medications - No data to display    Assessments & Plan (with Medical Decision Making)     I have reviewed the nursing notes.    I have reviewed the findings, diagnosis, plan and need for follow up with the patient.  (W19.XXXA) Fall, initial encounter  (primary encounter diagnosis)  (S06.0X0A) Concussion without loss of consciousness, initial encounter  (M54.2) Cervicalgia  Alert, pleasant 56-year old female presents ambulatory from work for evaluation of injuries sustained when she was walking outside today and due to wet, slippery conditions both feet slipped from under her causing her to fall on her back and hit her head against a cement.  There was no loss of consciousness.  She has headache, fogginess, slowness with thinking.  She does have neck pain.  She also has some lower back pain.  She has no weakness lower extremities, numbness tingling, bowel or bladder changes, saddle anesthesia.  She has no chest pain, dyspnea, abdominal pain, nausea/vomiting.  CT head and cervical spine are without acute findings other than scalp laceration.  X-ray of lumbar spine is without acute findings.  Given head injury and symptoms she does have concussion.  Plan for discharge home and symptomatic  treatments.      Ivon Parra CNP    Discharge Medication List as of 11/11/2021 12:49 PM          Final diagnoses:   Fall, initial encounter   Concussion without loss of consciousness, initial encounter   Cervicalgia       11/11/2021   [facility]     Ivon Parra CNP  11/27/21 1524

## 2021-11-11 NOTE — ED TRIAGE NOTES
"\"Here for falling from a standing position outside and hitting head on the hard cement sidewalk.  Denies loss of consciousness, not on blood thinners, no nausea or vomiting, having neck pain, I am a little tired, not feeling right and slow to answer questions.\"  Cervical collar applied in Triage.    "

## 2021-11-11 NOTE — DISCHARGE INSTRUCTIONS
(W19.XXXA) Fall, initial encounter  (primary encounter diagnosis)  (S06.0X0A) Concussion without loss of consciousness, initial encounter  (M54.2) Cervicalgia  Alert, pleasant 56-year old female presents ambulatory from work for evaluation of injuries sustained when she was walking outside today and due to wet, slippery conditions both feet slipped from under her causing her to fall on her back and hit her head against a cement.  There was no loss of consciousness.  She has headache, fogginess, slowness with thinking.  He does have neck pain.  She also has some lower back pain.  She has no weakness lower extremities, numbness tingling, bowel or bladder changes, saddle anesthesia.  She has no chest pain, dyspnea, abdominal pain, nausea/vomiting.  CT head and cervical spine are without acute findings other than scalp laceration.  X-ray of lumbar spine is without acute findings.  Given head injury and symptoms she does have concussion.  Plan for discharge home and symptomatic treatments.  Recommend:  - BRAIN REST. It is important to not overstimulate brain and allow it to rest as it heals. This entails minimizing screen time, reading, etc. If you are doing an activity such as watching t.v. or reading and get symptoms of headache, lightheadedness, etc. You should stop activity and rest. If you are doing exertional physical activity and get symptoms of headache, lightheaded, dizziness - you should rest.   - Manage pain with acetaminophen (Tylenol) 1,000 mg every 8 hours and ibuprofen (Advil) 800 mg with food every 8 hours. *You can alternate these every 4 hours. For example: 8 am ibuprofen, 12 pm acetaminophen, 4 pm ibuprofen, 8 pm acetaminophen, etc.*  - ice pack to head/neck. Heat to neck  - Topical anesthetic such as lidocaine patch, biofreeze, etc.       RETURN TO THE ED WITH NEW OR WORSENING SYMPTOMS.    FOLLOW-UP WITH YOUR PRIMARY CARE PROVIDER IN 7-10 DAYS.      Ivon Parra CNP      Results for orders placed  or performed during the hospital encounter of 11/11/21   Head CT w/o contrast     Status: None    Narrative    CT HEAD W/O CONTRAST, CT CERVICAL SPINE W/O CONTRAST, 11/11/2021 12:23  PM    History: Female, age 56 years; Trauma - Head Injury    Comparison: MRI cervical spine 10/20/2016    Technique:  Head CT technique: CT scan was performed of the head/brain without  contrast. Sagittal, coronal and axial reconstructions were reviewed.    Cervical spine CT technique: CT was performed of the cervical spine.  Sagittal, coronal, and axial reconstructions were reviewed.    Findings:  Head CT: The ventricles and sulci are normal in size and shape. The  gray and white matter demonstrate normal differentiation. The bony  structures demonstrate no acute fracture. Mucous retention cyst is  seen in the right maxillary sinus. Scalp laceration is seen within the  left parietal region near the vertex.    Cervical spine CT: The  cervical spine demonstrates straightening  secondary to cervical collar. No acute fracture, no acute subluxation.  Soft tissues of the neck demonstrate no acute abnormality or  pathologic lymph node enlargement. Visualized portions of the lungs  are clear. Advanced facet joint degenerative changes are seen  throughout the cervical spine, most severe on the left at C3-4.        Impression    IMPRESSION:   Head CT: No evidence of acute intracranial abnormality. Left parietal  scalp laceration near the vertex.    Cervical spine CT: No evidence of acute or subacute bony abnormality.    Advanced facet arthropathy, most severe on the left at C3-4.    TAYLOR POTTER MD         SYSTEM ID:  [id number]   Cervical spine CT w/o contrast     Status: None    Narrative    CT HEAD W/O CONTRAST, CT CERVICAL SPINE W/O CONTRAST, 11/11/2021 12:23  PM    History: Female, age 56 years; Trauma - Head Injury    Comparison: MRI cervical spine 10/20/2016    Technique:  Head CT technique: CT scan was performed of the head/brain  without  contrast. Sagittal, coronal and axial reconstructions were reviewed.    Cervical spine CT technique: CT was performed of the cervical spine.  Sagittal, coronal, and axial reconstructions were reviewed.    Findings:  Head CT: The ventricles and sulci are normal in size and shape. The  gray and white matter demonstrate normal differentiation. The bony  structures demonstrate no acute fracture. Mucous retention cyst is  seen in the right maxillary sinus. Scalp laceration is seen within the  left parietal region near the vertex.    Cervical spine CT: The  cervical spine demonstrates straightening  secondary to cervical collar. No acute fracture, no acute subluxation.  Soft tissues of the neck demonstrate no acute abnormality or  pathologic lymph node enlargement. Visualized portions of the lungs  are clear. Advanced facet joint degenerative changes are seen  throughout the cervical spine, most severe on the left at C3-4.        Impression    IMPRESSION:   Head CT: No evidence of acute intracranial abnormality. Left parietal  scalp laceration near the vertex.    Cervical spine CT: No evidence of acute or subacute bony abnormality.    Advanced facet arthropathy, most severe on the left at C3-4.    TAYLOR POTTER MD         SYSTEM ID:  [id number]   Lumbar spine XR, 2-3 views     Status: None    Narrative    Exam: XR LUMBAR SPINE 2-3 VIEWS     History:Female, age 56 years, fall, injury    Comparison:  None    Technique: Three views are submitted.    Findings: Bones are osteopenic. No evidence of acute or subacute  fracture. No evidence of acute subluxation. There is generalized  straightening of the normal rounded curvature suggesting muscle spasm.  Advanced degenerative changes are seen within the facet joints and  endplates in the lower lumbar spine.           Impression    Impression:  No evidence of acute or subacute bony abnormality.     Lumbar spine straightening suggesting muscle spasm.    Moderate to severe  multilevel degenerative change, most evident at  L5-S1 and L4-5.    TAYLOR POTTER MD         SYSTEM ID:  [id number]

## 2021-11-23 ENCOUNTER — OFFICE VISIT (OUTPATIENT)
Dept: CHIROPRACTIC MEDICINE | Facility: OTHER | Age: 56
End: 2021-11-23
Attending: CHIROPRACTOR
Payer: OTHER MISCELLANEOUS

## 2021-11-23 DIAGNOSIS — M54.42 ACUTE BILATERAL LOW BACK PAIN WITH BILATERAL SCIATICA: ICD-10-CM

## 2021-11-23 DIAGNOSIS — M54.41 ACUTE BILATERAL LOW BACK PAIN WITH BILATERAL SCIATICA: ICD-10-CM

## 2021-11-23 DIAGNOSIS — M99.02 SEGMENTAL AND SOMATIC DYSFUNCTION OF THORACIC REGION: ICD-10-CM

## 2021-11-23 DIAGNOSIS — M99.03 SEGMENTAL AND SOMATIC DYSFUNCTION OF LUMBAR REGION: Primary | ICD-10-CM

## 2021-11-23 PROCEDURE — 98940 CHIROPRACT MANJ 1-2 REGIONS: CPT | Mod: AT | Performed by: CHIROPRACTOR

## 2021-11-27 ASSESSMENT — ENCOUNTER SYMPTOMS
MYALGIAS: 0
SHORTNESS OF BREATH: 0
VOMITING: 0
LIGHT-HEADEDNESS: 1
NECK PAIN: 1
HEADACHES: 1
PALPITATIONS: 0
ABDOMINAL PAIN: 0
BACK PAIN: 0
DIFFICULTY URINATING: 0
WOUND: 0
NAUSEA: 0
ARTHRALGIAS: 0
COLOR CHANGE: 0
DIZZINESS: 1

## 2021-11-29 ENCOUNTER — OFFICE VISIT (OUTPATIENT)
Dept: CHIROPRACTIC MEDICINE | Facility: OTHER | Age: 56
End: 2021-11-29
Attending: CHIROPRACTOR
Payer: OTHER MISCELLANEOUS

## 2021-11-29 DIAGNOSIS — M99.03 SEGMENTAL AND SOMATIC DYSFUNCTION OF LUMBAR REGION: Primary | ICD-10-CM

## 2021-11-29 DIAGNOSIS — M99.02 SEGMENTAL AND SOMATIC DYSFUNCTION OF THORACIC REGION: ICD-10-CM

## 2021-11-29 DIAGNOSIS — M54.50 ACUTE BILATERAL LOW BACK PAIN WITHOUT SCIATICA: ICD-10-CM

## 2021-11-29 PROCEDURE — 98940 CHIROPRACT MANJ 1-2 REGIONS: CPT | Mod: AT | Performed by: CHIROPRACTOR

## 2021-12-01 ENCOUNTER — OFFICE VISIT (OUTPATIENT)
Dept: CHIROPRACTIC MEDICINE | Facility: OTHER | Age: 56
End: 2021-12-01
Attending: CHIROPRACTOR
Payer: OTHER MISCELLANEOUS

## 2021-12-01 DIAGNOSIS — M99.02 SEGMENTAL AND SOMATIC DYSFUNCTION OF THORACIC REGION: ICD-10-CM

## 2021-12-01 DIAGNOSIS — M99.03 SEGMENTAL AND SOMATIC DYSFUNCTION OF LUMBAR REGION: Primary | ICD-10-CM

## 2021-12-01 DIAGNOSIS — M54.50 ACUTE BILATERAL LOW BACK PAIN WITHOUT SCIATICA: ICD-10-CM

## 2021-12-01 PROCEDURE — 98940 CHIROPRACT MANJ 1-2 REGIONS: CPT | Mod: AT | Performed by: CHIROPRACTOR

## 2021-12-02 NOTE — PROGRESS NOTES
Subjective Finding:    Chief compalint: Patient presents with:  Back Pain: from a fall on ice at work..   WC and seeing MD for the issue as well and refered in for a consult/  , Pain Scale: 5/10, Intensity: dull, Duration: 1 weeks, Radiating: legs.    Date of injury:    Activities that the pain restricts:   Home/household/hobbies/social activities: yes.  Work duties: yes.  Sleep: yes.  Makes symptoms better: rest.  Makes symptoms worse: activity.  Have you seen anyone else for the symptoms? no.  Work related: yes.  Automobile related injury: no    Objective and Assessment:    Posture Analysis:   High shoulder: left.  Head tilt: left.  High iliac crest: .  Head carriage: forward.  Thoracic Kyphosis: neutral.  Lumbar Lordosis: neutral.    Lumbar Range of Motion: flexion decreased, extension decreased, left lateral flexion decreased and right lateral flexion decreased.  Cervical Range of Motion: extension decreased, left lateral flexion decreased and left rotation decreased.  Thoracic Range of Motion: extension decreased.  Extremity Range of Motion: .    Palpation:   L spine tightness    Segmental dysfunction pre-treatment and treatment area: T6      L5   SI bilaterally    Assessment post-treatment:  Cervical: ROM increased.  Thoracic: ROM increased.  Lumbar: ROM increased.    Comments:   Complicating Factors: .X ray reivewed with patient    Plan / Procedure:    Treatment plan: 2 times week for 3 weeks  Instructed patient: stretch.  Short term goals: reduce pain, increase ROM and increase joint flexibility.  Long term goals: restore normal function.  Prognosis: very good.

## 2021-12-03 NOTE — PROGRESS NOTES
Subjective Finding:    Chief compalint: Patient presents with:  Neck Pain  , Pain Scale: 5/10, Intensity: dull, Duration: 1 weeks, Radiating: legs.    Date of injury:    Activities that the pain restricts:   Home/household/hobbies/social activities: yes.  Work duties: yes.  Sleep: yes.  Makes symptoms better: rest.  Makes symptoms worse: activity.  Have you seen anyone else for the symptoms? no.  Work related: yes.  Automobile related injury: no    Objective and Assessment:    Posture Analysis:   High shoulder: left.  Head tilt: left.  High iliac crest: .  Head carriage: forward.  Thoracic Kyphosis: neutral.  Lumbar Lordosis: neutral.    Lumbar Range of Motion: flexion decreased, extension decreased, left lateral flexion decreased and right lateral flexion decreased.  Cervical Range of Motion: extension decreased, left lateral flexion decreased and left rotation decreased.  Thoracic Range of Motion: extension decreased.  Extremity Range of Motion: .    Palpation:   L spine tightness    Segmental dysfunction pre-treatment and treatment area: T6      L5   SI bilaterally    Assessment post-treatment:  Cervical: ROM increased.  Thoracic: ROM increased.  Lumbar: ROM increased.    Comments:   Complicating Factors: .X ray reivewed with patient    Plan / Procedure:    Treatment plan: 2 times week for 3 weeks  Instructed patient: stretch.  Short term goals: reduce pain, increase ROM and increase joint flexibility.  Long term goals: restore normal function.  Prognosis: very good.

## 2021-12-06 ENCOUNTER — OFFICE VISIT (OUTPATIENT)
Dept: CHIROPRACTIC MEDICINE | Facility: OTHER | Age: 56
End: 2021-12-06
Attending: CHIROPRACTOR
Payer: OTHER MISCELLANEOUS

## 2021-12-06 DIAGNOSIS — M54.50 ACUTE BILATERAL LOW BACK PAIN WITHOUT SCIATICA: ICD-10-CM

## 2021-12-06 DIAGNOSIS — M99.03 SEGMENTAL AND SOMATIC DYSFUNCTION OF LUMBAR REGION: Primary | ICD-10-CM

## 2021-12-06 DIAGNOSIS — M99.02 SEGMENTAL AND SOMATIC DYSFUNCTION OF THORACIC REGION: ICD-10-CM

## 2021-12-06 PROCEDURE — 98940 CHIROPRACT MANJ 1-2 REGIONS: CPT | Mod: AT | Performed by: CHIROPRACTOR

## 2021-12-07 NOTE — PROGRESS NOTES
Subjective Finding:    Chief compalint: Patient presents with:  Back Pain: getting better with treatment.  still on part time in work due to the injury,  seeing MD as well for the sx  , Pain Scale: 5/10, Intensity: dull, Duration: 1 weeks, Radiating: legs.    Date of injury:    Activities that the pain restricts:   Home/household/hobbies/social activities: yes.  Work duties: yes.  Sleep: yes.  Makes symptoms better: rest.  Makes symptoms worse: activity.  Have you seen anyone else for the symptoms? no.  Work related: yes.  Automobile related injury: no    Objective and Assessment:    Posture Analysis:   High shoulder: left.  Head tilt: left.  High iliac crest: .  Head carriage: forward.  Thoracic Kyphosis: neutral.  Lumbar Lordosis: neutral.    Lumbar Range of Motion: flexion decreased, extension decreased, left lateral flexion decreased and right lateral flexion decreased.  Cervical Range of Motion: extension decreased, left lateral flexion decreased and left rotation decreased.  Thoracic Range of Motion: extension decreased.  Extremity Range of Motion: .    Palpation:   L spine tightness    Segmental dysfunction pre-treatment and treatment area: T6      L5   SI bilaterally    Assessment post-treatment:  Cervical: ROM increased.  Thoracic: ROM increased.  Lumbar: ROM increased.    Comments:   Complicating Factors: .X ray reivewed with patient    Plan / Procedure:    Treatment plan: 2 times week for 3 weeks  Instructed patient: stretch.  Short term goals: reduce pain, increase ROM and increase joint flexibility.  Long term goals: restore normal function.  Prognosis: very good.

## 2021-12-07 NOTE — PROGRESS NOTES
Subjective Finding:    Chief compalint: Patient presents with:  Back Pain  , Pain Scale: 3/10, Intensity: dull, Duration: 3 weeks, Radiating: legs.    Date of injury:    Activities that the pain restricts:   Home/household/hobbies/social activities: yes.  Work duties: yes.  Sleep: yes.  Makes symptoms better: rest.  Makes symptoms worse: activity.  Have you seen anyone else for the symptoms? no.  Work related: yes.  Automobile related injury: no    Objective and Assessment:    Posture Analysis:   High shoulder: left.  Head tilt: left.  High iliac crest: .  Head carriage: forward.  Thoracic Kyphosis: neutral.  Lumbar Lordosis: neutral.    Lumbar Range of Motion: flexion decreased, extension decreased, left lateral flexion decreased and right lateral flexion decreased.  Cervical Range of Motion: extension decreased, left lateral flexion decreased and left rotation decreased.  Thoracic Range of Motion: extension decreased.  Extremity Range of Motion: .    Palpation:   L spine tightness    Segmental dysfunction pre-treatment and treatment area: T6      L5   SI bilaterally    Assessment post-treatment:  Cervical: ROM increased.  Thoracic: ROM increased.  Lumbar: ROM increased.    Comments:   Complicating Factors: .X ray reivewed with patient    Plan / Procedure:    Treatment plan: 2 times week for 3 weeks  Instructed patient: stretch.  Short term goals: reduce pain, increase ROM and increase joint flexibility.  Long term goals: restore normal function.  Prognosis: very good.

## 2021-12-08 ENCOUNTER — OFFICE VISIT (OUTPATIENT)
Dept: CHIROPRACTIC MEDICINE | Facility: OTHER | Age: 56
End: 2021-12-08
Attending: CHIROPRACTOR
Payer: OTHER MISCELLANEOUS

## 2021-12-08 DIAGNOSIS — M54.50 ACUTE BILATERAL LOW BACK PAIN WITHOUT SCIATICA: ICD-10-CM

## 2021-12-08 DIAGNOSIS — M99.03 SEGMENTAL AND SOMATIC DYSFUNCTION OF LUMBAR REGION: Primary | ICD-10-CM

## 2021-12-08 DIAGNOSIS — M99.02 SEGMENTAL AND SOMATIC DYSFUNCTION OF THORACIC REGION: ICD-10-CM

## 2021-12-08 PROCEDURE — 98940 CHIROPRACT MANJ 1-2 REGIONS: CPT | Mod: AT | Performed by: CHIROPRACTOR

## 2021-12-13 ENCOUNTER — OFFICE VISIT (OUTPATIENT)
Dept: CHIROPRACTIC MEDICINE | Facility: OTHER | Age: 56
End: 2021-12-13
Attending: CHIROPRACTOR
Payer: OTHER MISCELLANEOUS

## 2021-12-13 DIAGNOSIS — M99.02 SEGMENTAL AND SOMATIC DYSFUNCTION OF THORACIC REGION: ICD-10-CM

## 2021-12-13 DIAGNOSIS — M54.50 ACUTE BILATERAL LOW BACK PAIN WITHOUT SCIATICA: ICD-10-CM

## 2021-12-13 DIAGNOSIS — M99.03 SEGMENTAL AND SOMATIC DYSFUNCTION OF LUMBAR REGION: Primary | ICD-10-CM

## 2021-12-13 PROCEDURE — 98940 CHIROPRACT MANJ 1-2 REGIONS: CPT | Mod: AT | Performed by: CHIROPRACTOR

## 2021-12-13 NOTE — PROGRESS NOTES
Subjective Finding:    Chief compalint: Patient presents with:  Back Pain: better.  she still is on light duty  , Pain Scale: 3/10, Intensity: dull, Duration: 3 weeks, Radiating: legs.    Date of injury:    Activities that the pain restricts:   Home/household/hobbies/social activities: yes.  Work duties: yes.  Sleep: yes.  Makes symptoms better: rest.  Makes symptoms worse: activity.  Have you seen anyone else for the symptoms? no.  Work related: yes.  Automobile related injury: no    Objective and Assessment:    Posture Analysis:   High shoulder: left.  Head tilt: left.  High iliac crest: .  Head carriage: forward.  Thoracic Kyphosis: neutral.  Lumbar Lordosis: neutral.    Lumbar Range of Motion: flexion decreased, extension decreased, left lateral flexion decreased and right lateral flexion decreased.  Cervical Range of Motion: extension decreased, left lateral flexion decreased and left rotation decreased.  Thoracic Range of Motion: extension decreased.  Extremity Range of Motion: .    Palpation:   L spine tightness    Segmental dysfunction pre-treatment and treatment area: T6      L5   SI bilaterally    Assessment post-treatment:  Cervical: ROM increased.  Thoracic: ROM increased.  Lumbar: ROM increased.    Comments:   Complicating Factors: .X ray reivewed with patient    Plan / Procedure:    Treatment plan: 2 times week for 3 weeks  Instructed patient: stretch.  Short term goals: reduce pain, increase ROM and increase joint flexibility.  Long term goals: restore normal function.  Prognosis: very good.

## 2021-12-20 ENCOUNTER — OFFICE VISIT (OUTPATIENT)
Dept: CHIROPRACTIC MEDICINE | Facility: OTHER | Age: 56
End: 2021-12-20
Attending: CHIROPRACTOR
Payer: OTHER MISCELLANEOUS

## 2021-12-20 DIAGNOSIS — M99.02 SEGMENTAL AND SOMATIC DYSFUNCTION OF THORACIC REGION: ICD-10-CM

## 2021-12-20 DIAGNOSIS — M54.50 ACUTE BILATERAL LOW BACK PAIN WITHOUT SCIATICA: ICD-10-CM

## 2021-12-20 DIAGNOSIS — M99.03 SEGMENTAL AND SOMATIC DYSFUNCTION OF LUMBAR REGION: Primary | ICD-10-CM

## 2021-12-20 PROCEDURE — 98940 CHIROPRACT MANJ 1-2 REGIONS: CPT | Mod: AT | Performed by: CHIROPRACTOR

## 2021-12-21 NOTE — PROGRESS NOTES
Subjective Finding:    Chief compalint: Patient presents with:  Back Pain: getting better in low back.  she is back to work full time with limitations of no outside work recess.  this restriction is per MD.  overall good improvements with chiro tx  , Pain Scale: 3/10, Intensity: dull, Duration: 3 weeks, Radiating: legs.    Date of injury:    Activities that the pain restricts:   Home/household/hobbies/social activities: yes.  Work duties: yes.  Sleep: yes.  Makes symptoms better: rest.  Makes symptoms worse: activity.  Have you seen anyone else for the symptoms? no.  Work related: yes.  Automobile related injury: no    Objective and Assessment:    Posture Analysis:   High shoulder: left.  Head tilt: left.  High iliac crest: .  Head carriage: forward.  Thoracic Kyphosis: neutral.  Lumbar Lordosis: neutral.    Lumbar Range of Motion: flexion decreased, extension decreased, left lateral flexion decreased and right lateral flexion decreased.  Cervical Range of Motion: extension decreased, left lateral flexion decreased and left rotation decreased.  Thoracic Range of Motion: extension decreased.  Extremity Range of Motion: .    Palpation:   L spine tightness    Segmental dysfunction pre-treatment and treatment area: T6      L5   SI bilaterally    Assessment post-treatment:  Cervical: ROM increased.  Thoracic: ROM increased.  Lumbar: ROM increased.    Comments:   Complicating Factors: .X ray reivewed with patient    Plan / Procedure:    Treatment plan: 2 times week for 3 weeks  Instructed patient: stretch.  Short term goals: reduce pain, increase ROM and increase joint flexibility.  Long term goals: restore normal function.  Prognosis: very good.

## 2022-01-05 ENCOUNTER — OFFICE VISIT (OUTPATIENT)
Dept: CHIROPRACTIC MEDICINE | Facility: OTHER | Age: 57
End: 2022-01-05
Attending: CHIROPRACTOR
Payer: OTHER MISCELLANEOUS

## 2022-01-05 DIAGNOSIS — M54.50 ACUTE BILATERAL LOW BACK PAIN WITHOUT SCIATICA: ICD-10-CM

## 2022-01-05 DIAGNOSIS — M99.03 SEGMENTAL AND SOMATIC DYSFUNCTION OF LUMBAR REGION: Primary | ICD-10-CM

## 2022-01-05 DIAGNOSIS — M99.02 SEGMENTAL AND SOMATIC DYSFUNCTION OF THORACIC REGION: ICD-10-CM

## 2022-01-05 PROCEDURE — 98940 CHIROPRACT MANJ 1-2 REGIONS: CPT | Mod: AT | Performed by: CHIROPRACTOR

## 2022-01-10 ENCOUNTER — OFFICE VISIT (OUTPATIENT)
Dept: CHIROPRACTIC MEDICINE | Facility: OTHER | Age: 57
End: 2022-01-10
Attending: CHIROPRACTOR
Payer: OTHER MISCELLANEOUS

## 2022-01-10 DIAGNOSIS — M99.02 SEGMENTAL AND SOMATIC DYSFUNCTION OF THORACIC REGION: ICD-10-CM

## 2022-01-10 DIAGNOSIS — M54.50 ACUTE BILATERAL LOW BACK PAIN WITHOUT SCIATICA: ICD-10-CM

## 2022-01-10 DIAGNOSIS — M99.01 SEGMENTAL AND SOMATIC DYSFUNCTION OF CERVICAL REGION: ICD-10-CM

## 2022-01-10 DIAGNOSIS — M99.03 SEGMENTAL AND SOMATIC DYSFUNCTION OF LUMBAR REGION: Primary | ICD-10-CM

## 2022-01-10 PROCEDURE — 98940 CHIROPRACT MANJ 1-2 REGIONS: CPT | Mod: AT | Performed by: CHIROPRACTOR

## 2022-01-11 NOTE — PROGRESS NOTES
Subjective Finding:    Chief compalint: Patient presents with:  Back Pain: low back pain is getting better  , Pain Scale: 3/10, Intensity: dull, Duration: 3 weeks, Radiating: legs.    Date of injury:    Activities that the pain restricts:   Home/household/hobbies/social activities: yes.  Work duties: yes.  Sleep: yes.  Makes symptoms better: rest.  Makes symptoms worse: activity.  Have you seen anyone else for the symptoms? no.  Work related: yes.  Automobile related injury: no    Objective and Assessment:    Posture Analysis:   High shoulder: left.  Head tilt: left.  High iliac crest: .  Head carriage: forward.  Thoracic Kyphosis: neutral.  Lumbar Lordosis: neutral.    Lumbar Range of Motion: flexion decreased, extension decreased, left lateral flexion decreased and right lateral flexion decreased.  Cervical Range of Motion: extension decreased, left lateral flexion decreased and left rotation decreased.  Thoracic Range of Motion: extension decreased.  Extremity Range of Motion: .    Palpation:   L spine tightness    Segmental dysfunction pre-treatment and treatment area: T6      L5   SI bilaterally    Assessment post-treatment:  Cervical: ROM increased.  Thoracic: ROM increased.  Lumbar: ROM increased.    Comments:   Complicating Factors: .X ray reivewed with patient    Plan / Procedure:    Treatment plan: 2 times week for 3 weeks  Instructed patient: stretch.  Short term goals: reduce pain, increase ROM and increase joint flexibility.  Long term goals: restore normal function.  Prognosis: very good.

## 2022-01-12 ENCOUNTER — OFFICE VISIT (OUTPATIENT)
Dept: CHIROPRACTIC MEDICINE | Facility: OTHER | Age: 57
End: 2022-01-12
Attending: CHIROPRACTOR
Payer: OTHER MISCELLANEOUS

## 2022-01-12 DIAGNOSIS — M54.50 ACUTE BILATERAL LOW BACK PAIN WITHOUT SCIATICA: ICD-10-CM

## 2022-01-12 DIAGNOSIS — M99.03 SEGMENTAL AND SOMATIC DYSFUNCTION OF LUMBAR REGION: Primary | ICD-10-CM

## 2022-01-12 DIAGNOSIS — M99.02 SEGMENTAL AND SOMATIC DYSFUNCTION OF THORACIC REGION: ICD-10-CM

## 2022-01-12 PROCEDURE — 98940 CHIROPRACT MANJ 1-2 REGIONS: CPT | Mod: AT | Performed by: CHIROPRACTOR

## 2022-01-13 NOTE — PROGRESS NOTES
Subjective Finding:    Chief compalint: Patient presents with:  Back Pain  Neck Pain  , Pain Scale: 3/10, Intensity: dull, Duration: 5 weeks, Radiating: legs.    Date of injury:    Activities that the pain restricts:   Home/household/hobbies/social activities: yes.  Work duties: yes.  Sleep: yes.  Makes symptoms better: rest.  Makes symptoms worse: activity.  Have you seen anyone else for the symptoms? no.  Work related: yes.  Automobile related injury: no    Objective and Assessment:    Posture Analysis:   High shoulder: left.  Head tilt: left.  High iliac crest: .  Head carriage: forward.  Thoracic Kyphosis: neutral.  Lumbar Lordosis: neutral.    Lumbar Range of Motion: flexion decreased, extension decreased, left lateral flexion decreased and right lateral flexion decreased.  Cervical Range of Motion: extension decreased, left lateral flexion decreased and left rotation decreased.  Thoracic Range of Motion: extension decreased.  Extremity Range of Motion: .    Palpation:   L spine tightness    Segmental dysfunction pre-treatment and treatment area: T6      L5   SI bilaterally    Assessment post-treatment:  Cervical: ROM increased.  Thoracic: ROM increased.  Lumbar: ROM increased.    Comments:   Complicating Factors: .X ray reivewed with patient    Plan / Procedure:    Treatment plan: 2 times week for 3 weeks  Instructed patient: stretch.  Short term goals: reduce pain, increase ROM and increase joint flexibility.  Long term goals: restore normal function.  Prognosis: very good.

## 2022-01-17 ENCOUNTER — OFFICE VISIT (OUTPATIENT)
Dept: CHIROPRACTIC MEDICINE | Facility: OTHER | Age: 57
End: 2022-01-17
Attending: CHIROPRACTOR
Payer: OTHER MISCELLANEOUS

## 2022-01-17 DIAGNOSIS — M99.02 SEGMENTAL AND SOMATIC DYSFUNCTION OF THORACIC REGION: ICD-10-CM

## 2022-01-17 DIAGNOSIS — M99.03 SEGMENTAL AND SOMATIC DYSFUNCTION OF LUMBAR REGION: Primary | ICD-10-CM

## 2022-01-17 DIAGNOSIS — M54.50 ACUTE BILATERAL LOW BACK PAIN WITHOUT SCIATICA: ICD-10-CM

## 2022-01-17 PROCEDURE — 98940 CHIROPRACT MANJ 1-2 REGIONS: CPT | Mod: AT | Performed by: CHIROPRACTOR

## 2022-01-17 NOTE — PROGRESS NOTES
Subjective Finding:    Chief compalint: Patient presents with:  Back Pain  , Pain Scale: 3/10, Intensity: dull, Duration: 5 weeks, Radiating: legs.    Date of injury:    Activities that the pain restricts:   Home/household/hobbies/social activities: yes.  Work duties: yes.  Sleep: yes.  Makes symptoms better: rest.  Makes symptoms worse: activity.  Have you seen anyone else for the symptoms? no.  Work related: yes.  Automobile related injury: no    Objective and Assessment:    Posture Analysis:   High shoulder: left.  Head tilt: left.  High iliac crest: .  Head carriage: forward.  Thoracic Kyphosis: neutral.  Lumbar Lordosis: neutral.    Lumbar Range of Motion: flexion decreased, extension decreased, left lateral flexion decreased and right lateral flexion decreased.  Cervical Range of Motion: extension decreased, left lateral flexion decreased and left rotation decreased.  Thoracic Range of Motion: extension decreased.  Extremity Range of Motion: .    Palpation:   L spine tightness    Segmental dysfunction pre-treatment and treatment area: T6      L5   SI bilaterally    Assessment post-treatment:  Cervical: ROM increased.  Thoracic: ROM increased.  Lumbar: ROM increased.    Comments:   Complicating Factors: .X ray reivewed with patient    Plan / Procedure:    Treatment plan: 2 times week for 3 weeks  Instructed patient: stretch.  Short term goals: reduce pain, increase ROM and increase joint flexibility.  Long term goals: restore normal function.  Prognosis: very good.

## 2022-04-30 ENCOUNTER — HOSPITAL ENCOUNTER (EMERGENCY)
Facility: HOSPITAL | Age: 57
Discharge: HOME | End: 2022-04-30
Attending: STUDENT IN AN ORGANIZED HEALTH CARE EDUCATION/TRAINING PROGRAM | Admitting: STUDENT IN AN ORGANIZED HEALTH CARE EDUCATION/TRAINING PROGRAM
Payer: COMMERCIAL

## 2022-04-30 VITALS
TEMPERATURE: 98.7 F | HEART RATE: 111 BPM | OXYGEN SATURATION: 94 % | SYSTOLIC BLOOD PRESSURE: 145 MMHG | RESPIRATION RATE: 16 BRPM | HEIGHT: 66 IN | BODY MASS INDEX: 38.57 KG/M2 | DIASTOLIC BLOOD PRESSURE: 91 MMHG | WEIGHT: 240 LBS

## 2022-04-30 DIAGNOSIS — R51.9 FACIAL PAIN: ICD-10-CM

## 2022-04-30 DIAGNOSIS — G50.0 TRIGEMINAL NEURALGIA: ICD-10-CM

## 2022-04-30 DIAGNOSIS — R74.8 ELEVATED LIVER ENZYMES: ICD-10-CM

## 2022-04-30 LAB
ALBUMIN SERPL-MCNC: 3.4 G/DL (ref 3.4–5)
ALBUMIN SERPL-MCNC: 3.5 G/DL (ref 3.4–5)
ALP SERPL-CCNC: 160 U/L (ref 40–150)
ALP SERPL-CCNC: 161 U/L (ref 40–150)
ALT SERPL W P-5'-P-CCNC: 95 U/L (ref 0–50)
ALT SERPL W P-5'-P-CCNC: 98 U/L (ref 0–50)
ANION GAP SERPL CALCULATED.3IONS-SCNC: 5 MMOL/L (ref 3–14)
APAP SERPL-MCNC: 10 MG/L (ref 10–30)
AST SERPL W P-5'-P-CCNC: 109 U/L (ref 0–45)
AST SERPL W P-5'-P-CCNC: 110 U/L (ref 0–45)
BILIRUB DIRECT SERPL-MCNC: 0.2 MG/DL (ref 0–0.2)
BILIRUB SERPL-MCNC: 0.5 MG/DL (ref 0.2–1.3)
BILIRUB SERPL-MCNC: 0.5 MG/DL (ref 0.2–1.3)
BUN SERPL-MCNC: 18 MG/DL (ref 7–30)
CALCIUM SERPL-MCNC: 8.8 MG/DL (ref 8.5–10.1)
CHLORIDE BLD-SCNC: 109 MMOL/L (ref 94–109)
CO2 SERPL-SCNC: 24 MMOL/L (ref 20–32)
CREAT SERPL-MCNC: 0.74 MG/DL (ref 0.52–1.04)
GFR SERPL CREATININE-BSD FRML MDRD: >90 ML/MIN/1.73M2
GLUCOSE BLD-MCNC: 192 MG/DL (ref 70–99)
INR PPP: 1.01 (ref 0.85–1.15)
POTASSIUM BLD-SCNC: 4.2 MMOL/L (ref 3.4–5.3)
PROT SERPL-MCNC: 6.8 G/DL (ref 6.8–8.8)
PROT SERPL-MCNC: 6.8 G/DL (ref 6.8–8.8)
SODIUM SERPL-SCNC: 138 MMOL/L (ref 133–144)

## 2022-04-30 PROCEDURE — 99283 EMERGENCY DEPT VISIT LOW MDM: CPT

## 2022-04-30 PROCEDURE — 99284 EMERGENCY DEPT VISIT MOD MDM: CPT | Performed by: STUDENT IN AN ORGANIZED HEALTH CARE EDUCATION/TRAINING PROGRAM

## 2022-04-30 PROCEDURE — 82248 BILIRUBIN DIRECT: CPT | Performed by: STUDENT IN AN ORGANIZED HEALTH CARE EDUCATION/TRAINING PROGRAM

## 2022-04-30 PROCEDURE — 36415 COLL VENOUS BLD VENIPUNCTURE: CPT | Performed by: STUDENT IN AN ORGANIZED HEALTH CARE EDUCATION/TRAINING PROGRAM

## 2022-04-30 PROCEDURE — 80143 DRUG ASSAY ACETAMINOPHEN: CPT | Performed by: STUDENT IN AN ORGANIZED HEALTH CARE EDUCATION/TRAINING PROGRAM

## 2022-04-30 PROCEDURE — 85610 PROTHROMBIN TIME: CPT | Performed by: STUDENT IN AN ORGANIZED HEALTH CARE EDUCATION/TRAINING PROGRAM

## 2022-04-30 PROCEDURE — 82247 BILIRUBIN TOTAL: CPT | Performed by: STUDENT IN AN ORGANIZED HEALTH CARE EDUCATION/TRAINING PROGRAM

## 2022-04-30 RX ORDER — GABAPENTIN 300 MG/1
300 CAPSULE ORAL 3 TIMES DAILY
Qty: 90 CAPSULE | Refills: 0 | Status: SHIPPED | OUTPATIENT
Start: 2022-04-30 | End: 2024-06-12

## 2022-04-30 RX ORDER — GABAPENTIN 300 MG/1
300 CAPSULE ORAL 3 TIMES DAILY
Qty: 90 CAPSULE | Refills: 0 | Status: SHIPPED | OUTPATIENT
Start: 2022-04-30 | End: 2022-04-30

## 2022-04-30 NOTE — DISCHARGE INSTRUCTIONS
Please follow-up with your primary care provider within the next week.  Call to schedule an appointment.  Please discuss MRI for further evaluation given my presumptive diagnosis at this time of trigeminal neuralgia.  He can return to the emergency department you have significant worsening of symptoms or new concerning symptoms.  Regarding the antibiotics that were prescribed, I would recommend finishing the entire course.

## 2022-04-30 NOTE — ED PROVIDER NOTES
History     Chief Complaint   Patient presents with     Sinusitis     HPI  Josephine Liu is a 57 year old female with a history of gastric bypass presents to the emergency department today complaining of right-sided facial pain along the forehead right cheek along the right mandible associated with paroxysms of sharp stabbing pains and paroxysms of achy pains in the face.  She states this has been ongoing for about 1 week, that she has never had symptoms like this before, that it seems to be centering around the area around her ear she has not had any hearing changes.  She was seen by family medicine on Monday and discharged with [facility], but the symptoms have persisted.  She denies any fevers, states she has been taking Tylenol for the pain.  She states over the last 10 hours she is taking 5 g of Tylenol.  She denies alcoholism but states that she has about 4-5 drinks per week.  She does note that she was previously alcoholic many years ago.  No abdominal pain, no slurring of speech numbness tingling weakness anywhere else.  No other complaints.    Allergies:  Allergies   Allergen Reactions     Altace [Ramipril]      Nuvigil [Armodafinil]        Problem List:    Patient Active Problem List    Diagnosis Date Noted     ACP (advance care planning) 05/20/2015     Priority: Medium     Advance Care Planning 5/20/2015: Receipt of ACP document:  Received: Health Care Directive which was witnessed or notarized on 2-13-15.  Document previously scanned on 3-18-15.  Validation form completed and sent to be scanned.  Code Status needs to be updated to reflect choices in most recent ACP document.  Confirmed/documented designated decision maker(s).  Added by Mar Sims RN, System ACP Coordinator Honoring Choices              Gastric bypass status for obesity      Priority: Medium        Past Medical History:    Past Medical History:   Diagnosis Date     Gastric bypass status for obesity        Past Surgical History:   "  History reviewed. No pertinent surgical history.    Family History:    History reviewed. No pertinent family history.    Social History:  Marital Status:   [2]  Social History     Tobacco Use     Smoking status: Never Smoker     Smokeless tobacco: Never Used   Substance Use Topics     Alcohol use: Yes     Comment: weekends     Drug use: No        Medications:    gabapentin (NEURONTIN) 300 MG capsule  biotin 2.5 mg/mL  budesonide (RINOCORT AQUA) 32 MCG/ACT nasal spray  cyclobenzaprine (FLEXERIL) 10 MG tablet  DULoxetine HCl (CYMBALTA PO)  FUROSEMIDE PO  LAMOTRIGINE PO  LEVOTHYROXINE SODIUM PO  loratadine (CLARITIN) 10 MG tablet  MAGNESIUM PO  MINOCYCLINE HCL PO  ROPINIROLE HCL PO  TRAZODONE HCL PO  VITAMIN D, CHOLECALCIFEROL, PO          Review of Systems  A complete review of systems was performed and is otherwise negative.     Physical Exam   BP: 145/91  Pulse: 111  Temp: 98.7  F (37.1  C)  Resp: 16  Height: 167.6 cm (5' 6\")  Weight: 108.9 kg (240 lb)  SpO2: 94 %      Physical Exam  Constitutional: Alert and conversant. NAD   HENT: NCAT, negative chvostek sign,   Eyes: Normal pupils   Neck: supple   CV: Normal rate, regular rhythm, no murmur   Pulmonary/Chest: Non-labored respirations, clear to auscultation bilaterally   Abdominal: Soft, non-tender, non-distended   MSK: TELLEZ.   Neuro: Alert and appropriate, CN 2-12 intact, Strength 5/5 and symmetric in the upper and lower extremities, SILT, normal gait, normal rapid alternating movements  Skin: Warm and dry. No diaphoresis. No rashes on exposed skin    Psych: Appropriate mood and affect     ED Course              ED Course as of 04/30/22 1138   Sat Apr 30, 2022   0833 57-year-old female presents to the emergency department today with right-sided facial pain and distributions of V1 V2 V3.  Characteristics of brief but intense bouts of pain that is shooting burning and stinging like argue for a trigeminal neuralgia etiology.  On my exam I do not see evidence " of Mchenry Barreto syndrome or other herpetic neuralgias.  She has been treated with antibiotics for sinusitis, however, her symptoms do not sound consistent with a sinusitis to me.  Neuroimaging would be reasonable with an MRI possible and MRA, however, this does not need to happen in the setting of the emergency department and it is a weekend and I do not have access to an MRI at this time.  Also considering headache etiology, however, no focal neurological deficits to warrant an extensive imaging work-up, not on blood thinners, not having the worst headache of her life, low concern for spontaneous subarachnoid hemorrhage, blood pressure is normal.  No neck stiffness, afebrile, doubt meningitis encephalitis.  Distribution is classically trigeminal, doubt cardiac etiology.  Oral exam without evidence of a PTA, no change in voice, doubt retropharyngeal abscess, no neck stiffness.  Nothing to suggest an hypertensive emergency or cerebral venous thrombosis.  Normal neuro exam.  No exam evidence of glaucoma and no tenderness over the temporal artery to suggest giant cell arteritis.  Of note the patient took at least 5 g of Tylenol in the last 10 to 12 hours.  Discussed the case with poison control, poison control thinks that she probably did not take enough to hurt herself, however, agrees with the plan to proceed with acetaminophen level testing, liver function testing, and evaluation for evidence of liver injury   0907 Adrian from Poison control: recommends PCP follow up, no need for NAC at this time. Does not require admission. Notes the elevated enzymes and low acetaminophen, we discussed the nomogram's limitations with chronic dosing.    1035 AST(!): 110  Stable, no significant increase given the stable liver enzymes, it also seems to me very unlikely that this is due to acetaminophen toxicity.  She does have an alcohol history.  I recommended close primary care follow-up recheck of labs.  I have also recommended MRI be  discussed with primary care for further evaluation.  Given the evidence of hepatic injury I will hold off on giving carbamazepine, discussed the case with our pharmacist and we agreed that gabapentin would be a reasonable second line agent.  I have ordered this for her.  Patient discharged in stable condition with all questions answered return precautions given     Procedures            Results for orders placed or performed during the hospital encounter of 04/30/22 (from the past 24 hour(s))   Comprehensive metabolic panel   Result Value Ref Range    Sodium 138 133 - 144 mmol/L    Potassium 4.2 3.4 - 5.3 mmol/L    Chloride 109 94 - 109 mmol/L    Carbon Dioxide (CO2) 24 20 - 32 mmol/L    Anion Gap 5 3 - 14 mmol/L    Urea Nitrogen 18 7 - 30 mg/dL    Creatinine 0.74 0.52 - 1.04 mg/dL    Calcium 8.8 8.5 - 10.1 mg/dL    Glucose 192 (H) 70 - 99 mg/dL    Alkaline Phosphatase 160 (H) 40 - 150 U/L     (H) 0 - 45 U/L    ALT 95 (H) 0 - 50 U/L    Protein Total 6.8 6.8 - 8.8 g/dL    Albumin 3.4 3.4 - 5.0 g/dL    Bilirubin Total 0.5 0.2 - 1.3 mg/dL    GFR Estimate >90 >60 mL/min/1.73m2   INR   Result Value Ref Range    INR 1.01 0.85 - 1.15   Acetaminophen level   Result Value Ref Range    Acetaminophen 10 10 - 30 mg/L   Hepatic panel   Result Value Ref Range    Bilirubin Total 0.5 0.2 - 1.3 mg/dL    Bilirubin Direct 0.2 0.0 - 0.2 mg/dL    Protein Total 6.8 6.8 - 8.8 g/dL    Albumin 3.5 3.4 - 5.0 g/dL    Alkaline Phosphatase 161 (H) 40 - 150 U/L     (H) 0 - 45 U/L    ALT 98 (H) 0 - 50 U/L       Medications - No data to display    Assessments & Plan (with Medical Decision Making)     I have reviewed the nursing notes.    I have reviewed the findings, diagnosis, plan and need for follow up with the patient.      Discharge Medication List as of 4/30/2022 10:49 AM      START taking these medications    Details   gabapentin (NEURONTIN) 300 MG capsule Take 1 capsule (300 mg) by mouth 3 times daily, Disp-90 capsule, R-0,  E-Prescribe             Final diagnoses:   Elevated liver enzymes   Facial pain   Trigeminal neuralgia       4/30/2022   [facility]     Anthony Velazquez MD  04/30/22 1132

## 2022-04-30 NOTE — ED NOTES
Discharge instructions reviewed with patient.  Patient requested to have her RX sent to [facility]; MD aware and changes made.  Patient without any further questions or concerns.  Will follow up with primary this week.  Is aware that she is not to take any more/as much tylenol as she has been.  No further questions or concerns.  Home.

## 2022-04-30 NOTE — ED TRIAGE NOTES
Pt in for evaluation of sinus pressure ongoing for the last week. Was seen at [facility] on Monday, was placed on Levaquin x10 days. No improvement.

## 2022-05-03 ENCOUNTER — MEDICAL CORRESPONDENCE (OUTPATIENT)
Dept: MRI IMAGING | Facility: HOSPITAL | Age: 57
End: 2022-05-03
Payer: COMMERCIAL

## 2022-05-11 ENCOUNTER — HOSPITAL ENCOUNTER (OUTPATIENT)
Dept: MRI IMAGING | Facility: HOSPITAL | Age: 57
Discharge: HOME | End: 2022-05-11
Attending: NURSE PRACTITIONER | Admitting: NURSE PRACTITIONER
Payer: COMMERCIAL

## 2022-05-11 DIAGNOSIS — G50.0 TRIGEMINAL NEURALGIA: ICD-10-CM

## 2022-05-11 DIAGNOSIS — R51.9 HEADACHE, UNSPECIFIED: ICD-10-CM

## 2022-05-11 PROCEDURE — 70553 MRI BRAIN STEM W/O & W/DYE: CPT

## 2022-05-11 PROCEDURE — 255N000002 HC RX 255 OP 636: Performed by: RADIOLOGY

## 2022-05-11 PROCEDURE — A9585 GADOBUTROL INJECTION: HCPCS | Performed by: RADIOLOGY

## 2022-05-11 RX ORDER — GADOBUTROL 604.72 MG/ML
10 INJECTION INTRAVENOUS ONCE
Status: COMPLETED | OUTPATIENT
Start: 2022-05-11 | End: 2022-05-11

## 2022-05-11 RX ADMIN — GADOBUTROL 10 ML: 604.72 INJECTION INTRAVENOUS at 10:34

## 2023-11-02 ENCOUNTER — TRANSFERRED RECORDS (OUTPATIENT)
Dept: HEALTH INFORMATION MANAGEMENT | Facility: CLINIC | Age: 58
End: 2023-11-02

## 2024-04-09 ENCOUNTER — TRANSFERRED RECORDS (OUTPATIENT)
Dept: HEALTH INFORMATION MANAGEMENT | Facility: CLINIC | Age: 59
End: 2024-04-09

## 2024-04-11 ENCOUNTER — MEDICAL CORRESPONDENCE (OUTPATIENT)
Dept: CT IMAGING | Facility: HOSPITAL | Age: 59
End: 2024-04-11

## 2024-04-15 ENCOUNTER — HOSPITAL ENCOUNTER (OUTPATIENT)
Dept: CT IMAGING | Facility: HOSPITAL | Age: 59
Discharge: HOME | End: 2024-04-15
Attending: NURSE PRACTITIONER | Admitting: NURSE PRACTITIONER
Payer: COMMERCIAL

## 2024-04-15 DIAGNOSIS — R74.8 ABNORMAL LEVELS OF OTHER SERUM ENZYMES: ICD-10-CM

## 2024-04-15 DIAGNOSIS — R10.12 LEFT UPPER QUADRANT PAIN: ICD-10-CM

## 2024-04-15 PROCEDURE — 74177 CT ABD & PELVIS W/CONTRAST: CPT

## 2024-04-15 PROCEDURE — 250N000011 HC RX IP 250 OP 636: Performed by: RADIOLOGY

## 2024-04-15 RX ORDER — IOPAMIDOL 755 MG/ML
118 INJECTION, SOLUTION INTRAVASCULAR ONCE
Status: COMPLETED | OUTPATIENT
Start: 2024-04-15 | End: 2024-04-15

## 2024-04-15 RX ADMIN — IOPAMIDOL 118 ML: 755 INJECTION, SOLUTION INTRAVENOUS at 11:17

## 2024-04-23 ENCOUNTER — TRANSFERRED RECORDS (OUTPATIENT)
Dept: HEALTH INFORMATION MANAGEMENT | Facility: CLINIC | Age: 59
End: 2024-04-23

## 2024-06-10 ENCOUNTER — TRANSFERRED RECORDS (OUTPATIENT)
Dept: HEALTH INFORMATION MANAGEMENT | Facility: CLINIC | Age: 59
End: 2024-06-10

## 2024-06-10 LAB
ALT SERPL-CCNC: 33 U/L (ref 18–65)
AST SERPL-CCNC: 30 U/L (ref 10–30)
CREATININE (EXTERNAL): 0.73 MG/DL (ref 0.7–1.2)
GFR ESTIMATED (EXTERNAL): >90 ML/MIN/1.73M2
GLUCOSE (EXTERNAL): 186 MG/DL (ref 70–100)
HBA1C MFR BLD: 6.9 %
POTASSIUM (EXTERNAL): 4.4 MMOL/L (ref 3.5–5.1)
TSH SERPL-ACNC: 3.6 UIU/ML (ref 0.35–4.8)

## 2024-06-12 DIAGNOSIS — E11.9 NEW ONSET TYPE 2 DIABETES MELLITUS (H): Primary | ICD-10-CM

## 2024-06-12 RX ORDER — FLUTICASONE PROPIONATE 50 MCG
2 SPRAY, SUSPENSION (ML) NASAL DAILY PRN
COMMUNITY
End: 2024-09-30

## 2024-06-12 RX ORDER — BUSPIRONE HYDROCHLORIDE 5 MG/1
1 TABLET ORAL
COMMUNITY
Start: 2023-06-14 | End: 2024-08-15

## 2024-06-12 RX ORDER — LEVOTHYROXINE SODIUM 25 UG/1
TABLET ORAL
COMMUNITY
Start: 2024-04-14

## 2024-06-12 RX ORDER — LEVOTHYROXINE SODIUM 200 UG/1
200 TABLET ORAL DAILY
COMMUNITY
Start: 2024-05-10

## 2024-06-12 RX ORDER — MECLIZINE HYDROCHLORIDE 25 MG/1
TABLET ORAL
COMMUNITY
Start: 2024-06-02

## 2024-06-12 RX ORDER — MINOCYCLINE HYDROCHLORIDE 100 MG/1
100 CAPSULE ORAL 2 TIMES DAILY
COMMUNITY
Start: 2024-04-19 | End: 2024-08-15

## 2024-06-18 ENCOUNTER — HOSPITAL ENCOUNTER (OUTPATIENT)
Dept: EDUCATION SERVICES | Facility: HOSPITAL | Age: 59
Discharge: HOME | End: 2024-06-18
Attending: DIETITIAN, REGISTERED | Admitting: DIETITIAN, REGISTERED
Payer: COMMERCIAL

## 2024-06-18 VITALS
SYSTOLIC BLOOD PRESSURE: 135 MMHG | BODY MASS INDEX: 44.54 KG/M2 | RESPIRATION RATE: 16 BRPM | DIASTOLIC BLOOD PRESSURE: 82 MMHG | OXYGEN SATURATION: 97 % | HEART RATE: 89 BPM | HEIGHT: 65 IN | WEIGHT: 267.3 LBS

## 2024-06-18 DIAGNOSIS — E11.9 TYPE 2 DIABETES MELLITUS WITHOUT COMPLICATION, WITHOUT LONG-TERM CURRENT USE OF INSULIN (H): Primary | ICD-10-CM

## 2024-06-18 PROCEDURE — G0108 DIAB MANAGE TRN  PER INDIV: HCPCS | Performed by: DIETITIAN, REGISTERED

## 2024-06-18 RX ORDER — ACYCLOVIR 400 MG/1
TABLET ORAL
Qty: 3 EACH | Refills: 5 | Status: SHIPPED | OUTPATIENT
Start: 2024-06-18

## 2024-06-18 RX ORDER — DULOXETIN HYDROCHLORIDE 60 MG/1
60 CAPSULE, DELAYED RELEASE ORAL 2 TIMES DAILY
COMMUNITY
Start: 2024-06-12

## 2024-06-18 ASSESSMENT — PAIN SCALES - GENERAL: PAINLEVEL: MILD PAIN (3)

## 2024-06-18 NOTE — LETTER
6/18/2024      Josephine Wilkersonkhadijah  [address] [place] [address]        Diabetes Self-Management Education & Support    Presents for: Initial Assessment for new diagnosis (Session 1)    Type of Service: In Person Visit    ASSESSMENT:  Pt is here today for diabetes education.  She was dx with Type 2 DM > 20 years ago and had education at that time.  She had gastric bypass surgery in 2005 and lost >100# and had no glucose issues after that until now.  She has slowly regained the weight she lost with bypass surgery and would like to work on weight loss.  Pt listened and participated well in session.  She was most interested in meal planning and after discussion she would like to pursue a CGM for glucose monitoring.      Patient's most recent A1c 6.9%.  A1c  is meeting goal of <7.0    Diabetes knowledge and skills assessment:   Patient is knowledgeable in diabetes management concepts related to: Healthy Eating, Being Active, Monitoring, and Diabetes Pathophysiology.    Reviewed healthy, low carbohydrate meal plan - 45 grams/meal, 15-30 grams/snack, label reading, portion control, plate method, optimal weight loss.   Pt is willing to try to resume some exercises she has done in the past with an exercise ball.  Discussed options for glucose monitoring and pt would like to see if her insurance will cover Dexcom G7.  Reviewed basic use, times when sensor would need to be removed, target levels, trend arrows.    Medications- No glucose lowering medications ordered currently.  Discussed GLP-1 medications effect on weight.  Unsure if patient would be a candidate with questionable hx of pancreatitis - defer to provider.      Will continue to educate on diabetes management concepts as indicated.     Based on learning assessment above, most appropriate setting for further diabetes education would be: Individual setting.      PLAN  Follow healthy, low carbohydrate meal plan provided - 45 grams/meal, 15-30 grams/snack.  Keep  food logs to stay on track.   Begin some routine exercise.  Call if Dexcom G7 sensors are cost prohibitive or if help with set up is needed.     Follow-up: Session 2 - further review in 3-4 weeks.     See Care Plan for co-developed, patient-state behavior change goals.  AVS provided for patient today.    Education Materials Provided:  [employer] - Living Well with Diabetes A Guide for People with Diabetes and their Families  [employer] Planning Healthy Meals  [employer] Reading a Nutrition Facts Label  [employer] Low Blood Glucose  [employer] High Blood Glucose  [employer] Managing Diabetes Safely During Sick Days  [employer] Foot Care for People with Diabetes  [employer] Am I at Risk for Stroke?  [employer] How can I Lower my Risk for Cardiovascular Disease?  [employer] Proper Disposal of Household Medical Wastes  Diabetes Disaster Response Coalition Patient Preparedness Plan       SUBJECTIVE/OBJECTIVE:  Presents for: Initial Assessment for new diagnosis (Session 1)  Accompanied by: Self  Diabetes education in the past 24mo: No  Focus of Visit: Assistance w/ making life changes, Monitoring, Diabetes Pathophysiology, Healthy Eating, Being Active  Diabetes type: Type 2  Date of diagnosis: Pt was dx with Type 2 DM >20 years ago but then had gastric bypass in 2005 and had no issues.  She has now regained weight and glucose trended up.  Disease course: Other (see Comments)  Please elaborate:: As above  Transportation concerns: No  Difficulty affording diabetes medication?: No  Other concerns:: None  Cultural Influences/Ethnic Background:  Choose not to answer    Diabetes Symptoms & Complications:  Diabetes Related Symptoms: None  Weight trend: Increasing (Weight has been increasing over the past six years.)  Symptom course: Stable  Disease course: Other (see Comments)  Please elaborate:: As above  Complications assessed today?: Yes  CVA: No  Heart disease: No  Nephropathy: No    Patient Problem List and  "Family Medical History reviewed for relevant medical history, current medical status, and diabetes risk factors.    Vitals:  /82 (BP Location: Right arm, Patient Position: Sitting, Cuff Size: Adult Regular)   Pulse 89   Resp 16   Ht 1.657 m (5' 5.25\")   Wt 121.2 kg (267 lb 4.8 oz)   SpO2 97%   BMI 44.14 kg/m    Estimated body mass index is 44.14 kg/m  as calculated from the following:    Height as of this encounter: 1.657 m (5' 5.25\").    Weight as of this encounter: 121.2 kg (267 lb 4.8 oz).   Last 3 BP:   BP Readings from Last 3 Encounters:   06/18/24 135/82   04/30/22 145/91   11/11/21 148/100       History   Smoking Status     Never   Smokeless Tobacco     Never       Labs:  No results found for: \"A1C\", \"HEMOGLOBINA1\"  Lab Results   Component Value Date     04/30/2022     No results found for: \"LDL\"  No results found for: \"HDL\"]  GFR Estimate   Date Value Ref Range Status   04/30/2022 >90 >60 mL/min/1.73m2 Final     Comment:     Effective December 21, 2021 eGFRcr in adults is calculated using the 2021 CKD-EPI creatinine equation which includes age and gender (Jazzmine et al., [place], DOI: 10.1056/EORAgb4039280)     No results found for: \"GFRESTBLACK\"  Lab Results   Component Value Date    CR 0.74 04/30/2022     No results found for: \"MICROALBUMIN\"    Healthy Eating:  Healthy Eating Assessed Today: Yes  Cultural/Islam diet restrictions?: No  Do you have any food allergies or intolerances?: No  Meal planning/habits: Low carb (Started watching carbohdyrates in April.)  Who cooks/prepares meals for you?: Spouse  Who purchases food in  your home?: Spouse  How many times a week on average do you eat food made away from home (restaurant/take-out)?: 1  Meals include: Breakfast, Lunch, Dinner  Breakfast: Protein shake (protein liquid/protein powder/1/2 banana/1/4 cup oats/few nuts)  Lunch: leftover pizza or frozen meal  Dinner: meat, veggies  Snacks: HS-cheese or popcorn or chips  Beverages: Coffee, " Diet soda, Water  Has patient met with a dietitian in the past?: Yes    Being Active:  Being Active Assessed Today: Yes  Exercise:: Currently not exercising  Barrier to exercise: Physical limitation (arthritis in feet so walking hurts)    Monitoring:  Monitoring Assessed Today: Yes  Did patient bring glucose meter to appointment? : No (Pt does not have a meter.)    Taking Medications:  Taking Medication Assessed Today: Yes  Current Treatments: None    Problem Solving:  Problem Solving Assessed Today: Yes  Is the patient at risk for hypoglycemia?: No  Is the patient at risk for DKA?: No     Reducing Risks:  Reducing Risks Assessed Today: Yes  Diabetes Risks: Age over 45 years, Family History, Sedentary Lifestyle  CAD Risks: Diabetes Mellitus, Obesity, Sedentary lifestyle, Family history  Has dilated eye exam at least once a year?: Yes ([facility] in [place] [address] in [place])  Sees dentist every 6 months?: Yes  Feet checked by healthcare provider in the last year?: No    Healthy Coping:  Healthy Coping Assessed Today: Yes  Emotional response to diabetes: Acceptance, Concern for health and well-being, Ready to learn  Informal Support system:: Spouse  Stage of change: PREPARATION (Decided to change - considering how)  Patient Activation Measure Survey Score:       No data to display                Time Spent: 90 minutes  Encounter Type: Individual    Any diabetes medication dose changes were made via the CDE Protocol per the patient's referring provider. A copy of this encounter was shared with the provider.       Sincerely,        Elizabeth Cannon RD

## 2024-06-18 NOTE — PATIENT INSTRUCTIONS
-Follow healthy low carbohydrate meal plan provided - 45 grams/meal, 15-30 grams/snack.  -Keep food logs to help you stay on track.   -Work on more routine exercise as able.   -Use your Dexcom G7 for monitoring.  Call  if you have concerns or if cost is prohibitive.    -A1c waas 6.9% - goal is < 7.0%.    -Medications that may help with weight loss are called GLP-1 medications (Ozempic, Mounjaro, Rybelsus).   -Follow up in 3-4 weeks.    -ROBERTH Anguiano, [facility] [phone].

## 2024-06-18 NOTE — PROGRESS NOTES
Diabetes Self-Management Education & Support    Presents for: Initial Assessment for new diagnosis (Session 1)    Type of Service: In Person Visit    ASSESSMENT:  Pt is here today for diabetes education.  She was dx with Type 2 DM > 20 years ago and had education at that time.  She had gastric bypass surgery in 2005 and lost >100# and had no glucose issues after that until now.  She has slowly regained the weight she lost with bypass surgery and would like to work on weight loss.  Pt listened and participated well in session.  She was most interested in meal planning and after discussion she would like to pursue a CGM for glucose monitoring.      Patient's most recent A1c 6.9%.  A1c  is meeting goal of <7.0    Diabetes knowledge and skills assessment:   Patient is knowledgeable in diabetes management concepts related to: Healthy Eating, Being Active, Monitoring, and Diabetes Pathophysiology.    Reviewed healthy, low carbohydrate meal plan - 45 grams/meal, 15-30 grams/snack, label reading, portion control, plate method, optimal weight loss.   Pt is willing to try to resume some exercises she has done in the past with an exercise ball.  Discussed options for glucose monitoring and pt would like to see if her insurance will cover Dexcom G7.  Reviewed basic use, times when sensor would need to be removed, target levels, trend arrows.    Medications- No glucose lowering medications ordered currently.  Discussed GLP-1 medications effect on weight.  Unsure if patient would be a candidate with questionable hx of pancreatitis - defer to provider.      Will continue to educate on diabetes management concepts as indicated.     Based on learning assessment above, most appropriate setting for further diabetes education would be: Individual setting.      PLAN  Follow healthy, low carbohydrate meal plan provided - 45 grams/meal, 15-30 grams/snack.  Keep food logs to stay on track.   Begin some routine exercise.  Call if Dexcom G7  sensors are cost prohibitive or if help with set up is needed.     Follow-up: Session 2 - further review in 3-4 weeks.     See Care Plan for co-developed, patient-state behavior change goals.  AVS provided for patient today.    Education Materials Provided:  [employer] - Living Well with Diabetes A Guide for People with Diabetes and their Families  [employer] Planning Healthy Meals  [employer] Reading a Nutrition Facts Label  [employer] Low Blood Glucose  [employer] High Blood Glucose  [employer] Managing Diabetes Safely During Sick Days  [employer] Foot Care for People with Diabetes  [employer] Am I at Risk for Stroke?  [employer] How can I Lower my Risk for Cardiovascular Disease?  [employer] [place] [employer] Disposal of Household Medical Wastes  Diabetes Disaster Response [facility] Patient Preparedness [facility]       SUBJECTIVE/OBJECTIVE:  Presents for: Initial Assessment for new diagnosis (Session 1)  Accompanied by: Self  Diabetes education in the past 24mo: No  Focus of Visit: Assistance w/ making life changes, Monitoring, Diabetes Pathophysiology, Healthy Eating, Being Active  Diabetes type: Type 2  Date of diagnosis: Pt was dx with Type 2 DM >20 years ago but then had gastric bypass in 2005 and had no issues.  She has now regained weight and glucose trended up.  Disease course: Other (see Comments)  Please elaborate:: As above  Transportation concerns: No  Difficulty affording diabetes medication?: No  Other concerns:: None  Cultural Influences/Ethnic Background:  Choose not to answer    Diabetes Symptoms & Complications:  Diabetes Related Symptoms: None  Weight trend: Increasing (Weight has been increasing over the past six years.)  Symptom course: Stable  Disease course: Other (see Comments)  Please elaborate:: As above  Complications assessed today?: Yes  CVA: No  Heart disease: No  Nephropathy: No    Patient Problem List and Family Medical History reviewed for relevant medical history, current medical  "status, and diabetes risk factors.    Vitals:  /82 (BP Location: Right arm, Patient Position: Sitting, Cuff Size: Adult Regular)   Pulse 89   Resp 16   Ht 1.657 m (5' 5.25\")   Wt 121.2 kg (267 lb 4.8 oz)   SpO2 97%   BMI 44.14 kg/m    Estimated body mass index is 44.14 kg/m  as calculated from the following:    Height as of this encounter: 1.657 m (5' 5.25\").    Weight as of this encounter: 121.2 kg (267 lb 4.8 oz).   Last 3 BP:   BP Readings from Last 3 Encounters:   06/18/24 135/82   04/30/22 145/91   11/11/21 148/100       History   Smoking Status    Never   Smokeless Tobacco    Never       Labs:  No results found for: \"A1C\", \"HEMOGLOBINA1\"  Lab Results   Component Value Date     04/30/2022     No results found for: \"LDL\"  No results found for: \"HDL\"]  GFR Estimate   Date Value Ref Range Status   04/30/2022 >90 >60 mL/min/1.73m2 Final     Comment:     Effective December 21, 2021 eGFRcr in adults is calculated using the 2021 CKD-EPI creatinine equation which includes age and gender (Jazzmine et al., NE, DOI: 10.1056/[id number])     No results found for: \"GFRESTBLACK\"  Lab Results   Component Value Date    CR 0.74 04/30/2022     No results found for: \"MICROALBUMIN\"    Healthy Eating:  Healthy Eating Assessed Today: Yes  Cultural/Oriental orthodox diet restrictions?: No  Do you have any food allergies or intolerances?: No  Meal planning/habits: Low carb (Started watching carbohdyrates in April.)  Who cooks/prepares meals for you?: Spouse  Who purchases food in  your home?: Spouse  How many times a week on average do you eat food made away from home (restaurant/take-out)?: 1  Meals include: Breakfast, Lunch, Dinner  Breakfast: Protein shake (protein liquid/protein powder/1/2 banana/1/4 cup oats/few nuts)  Lunch: leftover pizza or frozen meal  Dinner: meat, veggies  Snacks: HS-cheese or popcorn or chips  Beverages: Coffee, Diet soda, Water  Has patient met with a dietitian in the past?: Yes    Being " Active:  Being Active Assessed Today: Yes  Exercise:: Currently not exercising  Barrier to exercise: Physical limitation (arthritis in feet so walking hurts)    Monitoring:  Monitoring Assessed Today: Yes  Did patient bring glucose meter to appointment? : No (Pt does not have a meter.)    Taking Medications:  Taking Medication Assessed Today: Yes  Current Treatments: None    Problem Solving:  Problem Solving Assessed Today: Yes  Is the patient at risk for hypoglycemia?: No  Is the patient at risk for DKA?: No     Reducing Risks:  Reducing Risks Assessed Today: Yes  Diabetes Risks: Age over 45 years, Family History, Sedentary Lifestyle  CAD Risks: Diabetes Mellitus, Obesity, Sedentary lifestyle, Family history  Has dilated eye exam at least once a year?: Yes ([facility] in [employer] [facility] in [place])  Sees dentist every 6 months?: Yes  Feet checked by healthcare provider in the last year?: No    Healthy Coping:  Healthy Coping Assessed Today: Yes  Emotional response to diabetes: Acceptance, Concern for health and well-being, Ready to learn  Informal Support system:: Spouse  Stage of change: PREPARATION (Decided to change - considering how)  Patient Activation Measure Survey Score:       No data to display                Time Spent: 90 minutes  Encounter Type: Individual    Any diabetes medication dose changes were made via the CDE Protocol per the patient's referring provider. A copy of this encounter was shared with the provider.

## 2024-06-24 ENCOUNTER — TELEPHONE (OUTPATIENT)
Dept: EDUCATION SERVICES | Facility: HOSPITAL | Age: 59
End: 2024-06-24

## 2024-06-24 NOTE — TELEPHONE ENCOUNTER
Pt called back and left a message what should she do with the Dexcom applicator and sensor? the sensor when she has used them?

## 2024-06-24 NOTE — TELEPHONE ENCOUNTER
Message left for return call.   ?if able to throw dexcom into the garbage.     Recommend placing in hard plastic container, write sharps on container and then okay to throw into the garbage.     Anne-Marie Montiel RN Diabetes Educator,  [phone]  6/24/2024 at 3:22 PM

## 2024-06-26 NOTE — TELEPHONE ENCOUNTER
Message left for return call.     For disposal for G7 CGM: patient able to dispose of in garbage as long as the  bottom cover that is removed to place the sensor on the skin is put back on. The G7 will be a self contained disposal.     If the applicator does not have the bottom cap placed back on after the sensor is applied - then the applicator needle is not contained.     Anne-Marie Montiel RN Diabetes Educator,  [phone]  6/26/2024 at 4:09 PM

## 2024-06-26 NOTE — TELEPHONE ENCOUNTER
Pt called back and left a message she has looked up the information on the internet. No need to call back.

## 2024-06-28 ENCOUNTER — PATIENT OUTREACH (OUTPATIENT)
Dept: EDUCATION SERVICES | Facility: HOSPITAL | Age: 59
End: 2024-06-28

## 2024-06-28 NOTE — PROGRESS NOTES
Pt called with questions regarding removing her Dexcom G7 sensor early as her son is getting  tomorrow and she is scheduled to change it.  Discussed options and pt plans to remove sensor prior to wedding and then not replace it the following day.  Discussed stopping current sensor if she desires to replace it prior to the current sensor running out.  Pt expressed understanding.  Follow up is scheduled.

## 2024-07-22 ENCOUNTER — HOSPITAL ENCOUNTER (OUTPATIENT)
Dept: EDUCATION SERVICES | Facility: HOSPITAL | Age: 59
Discharge: HOME | End: 2024-07-22
Attending: DIETITIAN, REGISTERED | Admitting: DIETITIAN, REGISTERED
Payer: COMMERCIAL

## 2024-07-22 VITALS
DIASTOLIC BLOOD PRESSURE: 82 MMHG | RESPIRATION RATE: 16 BRPM | HEIGHT: 65 IN | SYSTOLIC BLOOD PRESSURE: 118 MMHG | BODY MASS INDEX: 44.33 KG/M2 | WEIGHT: 266.1 LBS | OXYGEN SATURATION: 99 % | HEART RATE: 86 BPM

## 2024-07-22 DIAGNOSIS — E11.9 TYPE 2 DIABETES MELLITUS WITHOUT COMPLICATION, WITHOUT LONG-TERM CURRENT USE OF INSULIN (H): Primary | ICD-10-CM

## 2024-07-22 PROCEDURE — G0108 DIAB MANAGE TRN  PER INDIV: HCPCS | Performed by: DIETITIAN, REGISTERED

## 2024-07-22 ASSESSMENT — PAIN SCALES - GENERAL: PAINLEVEL: SEVERE PAIN (7)

## 2024-07-22 NOTE — PROGRESS NOTES
Diabetes Self-Management Education & Support    Presents for: Initial Assessment for new diagnosis    Type of Service: In Person Visit    ASSESSMENT:  Pt is here today for follow up diabetes visit.  She was able to obtain Dexcom G7 for monitoring and really likes it.  Discussion notes she has been working very hard on making changes to her diet - smaller portions, low carbohydrate, more veggies.  She is disappointed she only lost 1# in the past month.  Dexcom download today notes average glucose of 156 with GMI of 7.0%.  Download notes glucose levels trend above 180 after eating meals even though she is following a low carbohydrate eating plan.  Discussed addition of medications.   She is interested in GLP-1 medications for glucose control and weight loss but has questionable hx of pancreatitis.  Will defer to provider.     Patient's most recent A1c 6.9% (6/10/24).  A1c  is meeting goal of <7.0    Diabetes knowledge and skills assessment:   Patient is knowledgeable in diabetes management concepts related to: Healthy Eating, Being Active, Monitoring, and Healthy Coping    Will continue to educate on diabetes management concepts as indicated.     Based on learning assessment above, most appropriate setting for further diabetes education would be: Individual setting.      PLAN  Pt will continue to work on healthy, low carbohydrate meal plan and routine exercise.    Keep using Dexcom for monitoring.   Encouraged her to schedule appointment to provider to discuss possible GLP-1 or alternate medication.     Follow-up: After visit with provider - pt will call.     See Care Plan for co-developed, patient-state behavior change goals.  AVS provided for patient today.    Education Materials Provided:  No new materials today.     SUBJECTIVE/OBJECTIVE:  Presents for: Initial Assessment for new diagnosis  Accompanied by: Self  Diabetes education in the past 24mo: Yes  Focus of Visit: Assistance w/ making life changes, Monitoring,  "Healthy Eating, Taking Medication  Diabetes type: Type 2  Date of diagnosis: Pt was dx with Type 2 DM >20 years ago but then had gastric bypass in 2005 and had no issues.  She has now regained weight and glucose trended up.  Disease course: Other (see Comments)  Please elaborate:: As above  Diabetes management related comments/concerns: Pt states she has been working very hard on healthy food choices.  Disappointed she did not lose more weight.  Transportation concerns: No  Difficulty affording diabetes medication?: No  Difficulty affording diabetes testing supplies?: No (Dexcom G7 sensors cost 58.00/month.)  Other concerns:: None  Cultural Influences/Ethnic Background:  Choose not to answer    Diabetes Symptoms & Complications:  Diabetes Related Symptoms: None  Weight trend: Decreasing (Weight is down 1# in the past month.)  Symptom course: Stable  Disease course: Other (see Comments)  Please elaborate:: As above  Complications assessed today?: No  CVA: No  Heart disease: No  Nephropathy: No    Patient Problem List and Family Medical History reviewed for relevant medical history, current medical status, and diabetes risk factors.    Vitals:  /82   Pulse 86   Resp 16   Ht 1.645 m (5' 4.75\")   Wt 120.7 kg (266 lb 1.6 oz)   SpO2 99%   BMI 44.62 kg/m    Estimated body mass index is 44.62 kg/m  as calculated from the following:    Height as of this encounter: 1.645 m (5' 4.75\").    Weight as of this encounter: 120.7 kg (266 lb 1.6 oz).   Last 3 BP:   BP Readings from Last 3 Encounters:   07/22/24 118/82   06/18/24 135/82   04/30/22 145/91       History   Smoking Status    Never   Smokeless Tobacco    Never       Labs:  No results found for: \"A1C\", \"HEMOGLOBINA1\"  Lab Results   Component Value Date     04/30/2022     No results found for: \"LDL\"  No results found for: \"HDL\"]  GFR Estimate   Date Value Ref Range Status   04/30/2022 >90 >60 mL/min/1.73m2 Final     Comment:     Effective December 21, 2021 " "eGFRcr in adults is calculated using the 2021 CKD-EPI creatinine equation which includes age and gender (Jazzmine et al., [place], DOI: 10.1056/[id number])     No results found for: \"GFRESTBLACK\"  Lab Results   Component Value Date    CR 0.74 04/30/2022     No results found for: \"MICROALBUMIN\"    Healthy Eating:  Healthy Eating Assessed Today: Yes  Cultural/Rastafari diet restrictions?: No  Do you have any food allergies or intolerances?: No  Meal planning/habits: Low carb, Carb counting, Smaller portions, Other (Started watching carbohdyrates in April.)  Please elaborate:: More veggies  Who cooks/prepares meals for you?: Spouse  Who purchases food in  your home?: Spouse  How many times a week on average do you eat food made away from home (restaurant/take-out)?: 1  Meals include: Breakfast, Lunch, Dinner  Breakfast: Protein shake (protein liquid/protein powder/1/2 banana/1/4 cup oats/few nuts)  Lunch: sandwich or 1/2 sandwich (low carb bread), salad  Dinner: meat, salad or veggies  Snacks: HS-cheese or popcorn or Keto yogurt  Other: Pt has been very conscious regarding carbohydrate intake - even when dining out.  Beverages: Coffee, Diet soda, Water  Has patient met with a dietitian in the past?: Yes    Being Active:  Being Active Assessed Today: Yes  Exercise:: Yes (Uses exercise ball for her back.  Gardens.  Walking.)  Days per week of moderate to strenuous exercise (like a brisk walk): 4  On average, minutes per day of exercise at this level: 20  How intense was your typical exercise? : Light (like stretching or slow walking)  Exercise Minutes per Week: 80  Barrier to exercise: Physical limitation (arthritis in feet so walking hurts)    Monitoring:  Monitoring Assessed Today: Yes  Did patient bring glucose meter to appointment? : Yes  Blood Glucose Meter: CGM (Dexcom G7)    Dexcom G7 AGP Report  07/09/2024 to 07/22/2024    % Time CGM is Active 95%    Average Glucose  156    Glucose Management " Indicator  (GMI)    7.0%    Glucose Variabilty  21%    Time in Ranges:  >250 mg/dL   1%  181-250 mg/dL  18%   mg/dL   81%  54-69 mg/dL   0%  <54 mg/dL   \<1%     Taking Medications:  Taking Medication Assessed Today: Yes  Current Treatments: Diet    Problem Solving:  Problem Solving Assessed Today: Yes  Is the patient at risk for hypoglycemia?: No  Is the patient at risk for DKA?: No    Reducing Risks:  Reducing Risks Assessed Today: Yes  Diabetes Risks: Age over 45 years, Family History, Sedentary Lifestyle  CAD Risks: Diabetes Mellitus, Obesity, Sedentary lifestyle, Family history  Has dilated eye exam at least once a year?: Yes ([facility] in [employer] [facility] in [place])  Sees dentist every 6 months?: Yes  Feet checked by healthcare provider in the last year?: No    Healthy Coping:  Healthy Coping Assessed Today: Yes  Emotional response to diabetes: Acceptance, Concern for health and well-being, Ready to learn  Informal Support system:: Spouse  Stage of change: ACTION (Actively working towards change)  Patient Activation Measure Survey Score:       No data to display                Time Spent: 45 minutes  Encounter Type: Individual    Any diabetes medication dose changes were made via the CDE Protocol per the patient's referring provider. A copy of this encounter was shared with the provider.

## 2024-07-22 NOTE — PATIENT INSTRUCTIONS
-Keep working on healthy food choices and exercise - your are doing great!  -Weight loss will come!  -Continue to use the Dexcom G7 for monitoring.  -Medications that will help with glucose control and weight loss are called GLP-1 medications - Mounjaro (weekly injection), Ozempic (weekly injection), Rybelsus (daily medication).    Ask Babs Davila about pancreatitis diagnosis and use of GLP-1.    -Follow up with me after you see your.   -ROBERTH Anguiano, [facility] [phone].

## 2024-07-22 NOTE — LETTER
7/22/2024      Josephine Liu  [address] [place] [address]        Diabetes Self-Management Education & Support    Presents for: Initial Assessment for new diagnosis    Type of Service: In Person Visit    ASSESSMENT:  Pt is here today for follow up diabetes visit.  She was able to obtain Dexcom G7 for monitoring and really likes it.  Discussion notes she has been working very hard on making changes to her diet - smaller portions, low carbohydrate, more veggies.  She is disappointed she only lost 1# in the past month.  Dexcom download today notes average glucose of 156 with GMI of 7.0%.  Download notes glucose levels trend above 180 after eating meals even though she is following a low carbohydrate eating plan.  Discussed addition of medications.   She is interested in GLP-1 medications for glucose control and weight loss but has questionable hx of pancreatitis.  Will defer to provider.     Patient's most recent A1c 6.9% (6/10/24).  A1c  is meeting goal of <7.0    Diabetes knowledge and skills assessment:   Patient is knowledgeable in diabetes management concepts related to: Healthy Eating, Being Active, Monitoring, and Healthy Coping    Will continue to educate on diabetes management concepts as indicated.     Based on learning assessment above, most appropriate setting for further diabetes education would be: Individual setting.      PLAN  Pt will continue to work on healthy, low carbohydrate meal plan and routine exercise.    Keep using Dexcom for monitoring.   Encouraged her to schedule appointment to provider to discuss possible GLP-1 or alternate medication.     Follow-up: After visit with provider - pt will call.     See Care Plan for co-developed, patient-state behavior change goals.  AVS provided for patient today.    Education Materials Provided:  No new materials today.     SUBJECTIVE/OBJECTIVE:  Presents for: Initial Assessment for new diagnosis  Accompanied by: Self  Diabetes education in the  "past 24mo: Yes  Focus of Visit: Assistance w/ making life changes, Monitoring, Healthy Eating, Taking Medication  Diabetes type: Type 2  Date of diagnosis: Pt was dx with Type 2 DM >20 years ago but then had gastric bypass in 2005 and had no issues.  She has now regained weight and glucose trended up.  Disease course: Other (see Comments)  Please elaborate:: As above  Diabetes management related comments/concerns: Pt states she has been working very hard on healthy food choices.  Disappointed she did not lose more weight.  Transportation concerns: No  Difficulty affording diabetes medication?: No  Difficulty affording diabetes testing supplies?: No (Dexcom G7 sensors cost 58.00/month.)  Other concerns:: None  Cultural Influences/Ethnic Background:  Choose not to answer    Diabetes Symptoms & Complications:  Diabetes Related Symptoms: None  Weight trend: Decreasing (Weight is down 1# in the past month.)  Symptom course: Stable  Disease course: Other (see Comments)  Please elaborate:: As above  Complications assessed today?: No  CVA: No  Heart disease: No  Nephropathy: No    Patient Problem List and Family Medical History reviewed for relevant medical history, current medical status, and diabetes risk factors.    Vitals:  /82   Pulse 86   Resp 16   Ht 1.645 m (5' 4.75\")   Wt 120.7 kg (266 lb 1.6 oz)   SpO2 99%   BMI 44.62 kg/m    Estimated body mass index is 44.62 kg/m  as calculated from the following:    Height as of this encounter: 1.645 m (5' 4.75\").    Weight as of this encounter: 120.7 kg (266 lb 1.6 oz).   Last 3 BP:   BP Readings from Last 3 Encounters:   07/22/24 118/82   06/18/24 135/82   04/30/22 145/91       History   Smoking Status     Never   Smokeless Tobacco     Never       Labs:  No results found for: \"A1C\", \"HEMOGLOBINA1\"  Lab Results   Component Value Date     04/30/2022     No results found for: \"LDL\"  No results found for: \"HDL\"]  GFR Estimate   Date Value Ref Range Status " "  04/30/2022 >90 >60 mL/min/1.73m2 Final     Comment:     Effective December 21, 2021 eGFRcr in adults is calculated using the 2021 CKD-EPI creatinine equation which includes age and gender (Jazzmine et al., [place], DOI: 10.1056/MCDCzt8856619)     No results found for: \"GFRESTBLACK\"  Lab Results   Component Value Date    CR 0.74 04/30/2022     No results found for: \"MICROALBUMIN\"    Healthy Eating:  Healthy Eating Assessed Today: Yes  Cultural/Mandaen diet restrictions?: No  Do you have any food allergies or intolerances?: No  Meal planning/habits: Low carb, Carb counting, Smaller portions, Other (Started watching carbLaclede Grouprates in April.)  Please elaborate:: More veggies  Who cooks/prepares meals for you?: Spouse  Who purchases food in  your home?: Spouse  How many times a week on average do you eat food made away from home (restaurant/take-out)?: 1  Meals include: Breakfast, Lunch, Dinner  Breakfast: Protein shake (protein liquid/protein powder/1/2 banana/1/4 cup oats/few nuts)  Lunch: sandwich or 1/2 sandwich (low carb bread), salad  Dinner: meat, salad or veggies  Snacks: HS-cheese or popcorn or Keto yogurt  Other: Pt has been very conscious regarding carbohydrate intake - even when dining out.  Beverages: Coffee, Diet soda, Water  Has patient met with a dietitian in the past?: Yes    Being Active:  Being Active Assessed Today: Yes  Exercise:: Yes (Uses exercise ball for her back.  Gardens.  Walking.)  Days per week of moderate to strenuous exercise (like a brisk walk): 4  On average, minutes per day of exercise at this level: 20  How intense was your typical exercise? : Light (like stretching or slow walking)  Exercise Minutes per Week: 80  Barrier to exercise: Physical limitation (arthritis in feet so walking hurts)    Monitoring:  Monitoring Assessed Today: Yes  Did patient bring glucose meter to appointment? : Yes  Blood Glucose Meter: CGM (Dexcom G7)    Dexcom G7 P Report  07/09/2024 to 07/22/2024    % Time " CGM is Active 95%    Average Glucose  156    Glucose Management Indicator  (GMI)    7.0%    Glucose Variabilty  21%    Time in Ranges:  >250 mg/dL   1%  181-250 mg/dL  18%   mg/dL   81%  54-69 mg/dL   0%  <54 mg/dL   \<1%     Taking Medications:  Taking Medication Assessed Today: Yes  Current Treatments: Diet    Problem Solving:  Problem Solving Assessed Today: Yes  Is the patient at risk for hypoglycemia?: No  Is the patient at risk for DKA?: No    Reducing Risks:  Reducing Risks Assessed Today: Yes  Diabetes Risks: Age over 45 years, Family History, Sedentary Lifestyle  CAD Risks: Diabetes Mellitus, Obesity, Sedentary lifestyle, Family history  Has dilated eye exam at least once a year?: Yes ([facility] in [facility] in [place])  Sees dentist every 6 months?: Yes  Feet checked by healthcare provider in the last year?: No    Healthy Coping:  Healthy Coping Assessed Today: Yes  Emotional response to diabetes: Acceptance, Concern for health and well-being, Ready to learn  Informal Support system:: Spouse  Stage of change: ACTION (Actively working towards change)  Patient Activation Measure Survey Score:       No data to display                Time Spent: 45 minutes  Encounter Type: Individual    Any diabetes medication dose changes were made via the CDE Protocol per the patient's referring provider. A copy of this encounter was shared with the provider.       Sincerely,        Elizabeth Cannon RD

## 2024-07-25 ENCOUNTER — TRANSFERRED RECORDS (OUTPATIENT)
Dept: HEALTH INFORMATION MANAGEMENT | Facility: CLINIC | Age: 59
End: 2024-07-25

## 2024-07-29 ENCOUNTER — TELEPHONE (OUTPATIENT)
Dept: NUTRITION | Facility: HOSPITAL | Age: 59
End: 2024-07-29

## 2024-07-29 NOTE — TELEPHONE ENCOUNTER
Patient is wondering when you would you like her to follow up? She was able to already get in and see her PCP 2 days after seeing you.  Alicia Mcclain LPN

## 2024-08-15 ENCOUNTER — HOSPITAL ENCOUNTER (OUTPATIENT)
Dept: EDUCATION SERVICES | Facility: HOSPITAL | Age: 59
Discharge: HOME | End: 2024-08-15
Attending: DIETITIAN, REGISTERED | Admitting: DIETITIAN, REGISTERED
Payer: COMMERCIAL

## 2024-08-15 VITALS
OXYGEN SATURATION: 97 % | RESPIRATION RATE: 16 BRPM | HEIGHT: 65 IN | DIASTOLIC BLOOD PRESSURE: 82 MMHG | HEART RATE: 81 BPM | SYSTOLIC BLOOD PRESSURE: 118 MMHG | BODY MASS INDEX: 44.27 KG/M2 | WEIGHT: 265.7 LBS

## 2024-08-15 DIAGNOSIS — E11.9 TYPE 2 DIABETES MELLITUS WITHOUT COMPLICATION, WITHOUT LONG-TERM CURRENT USE OF INSULIN (H): Primary | ICD-10-CM

## 2024-08-15 PROCEDURE — G0108 DIAB MANAGE TRN  PER INDIV: HCPCS | Performed by: DIETITIAN, REGISTERED

## 2024-08-15 ASSESSMENT — PAIN SCALES - GENERAL: PAINLEVEL: SEVERE PAIN (7)

## 2024-08-15 NOTE — PROGRESS NOTES
Diabetes Self-Management Education & Support    Presents for: Individual review    Type of Service: In Person Visit    ASSESSMENT:  Pt saw provider since our last visit and was started on Metformin  mg daily vs Ozempic.  Pt states provider felt insurance coverage would be better for Metformin vs Ozempic.  Dexcom download today notes improvement in glucose levels from last download - average glucose 129, GMI 6.4%, 93% glucose levels in target range.  Pt did have some low alarms but was asymptomatic.  Weight is down 2# since initial visit to [facility] 6/28/24.  Pt is frustrated as she states she is working very hard on limiting carbohydrates in her diet.      Patient's most recent A1c 6.9% (6/10/24).  A1c is meeting goal of <7.0    Diabetes knowledge and skills assessment:   Patient is knowledgeable in diabetes management concepts related to: Healthy Eating, Being Active, Monitoring, and Taking Medication    Continue education with the following diabetes management concepts: Taking Medication and Problem Solving.   Discussed mechanism of action of Metformin XR.  Encouraged pt to discuss increased urination with provider at visit.    Provided pt with a glucose meter to confirm low alarms.  Reviewed s/s hypoglycemia and treatment.     Based on learning assessment above, most appropriate setting for further diabetes education would be: Individual setting.      PLAN  Continue to work on healthy, low carbohydrate meal plan - 45 grams/meal, 15-30 grams/snack.  Increase exercise as able.  Consider trial of Ozempic if pt unable to lose weight by next provider visit.  Return for teaching if Ozempic will be initiated.      Follow-up: Pending Ozempic start.  If not then 6 months.     See Care Plan for co-developed, patient-state behavior change goals.  AVS provided for patient today.    Education Materials Provided:  Accu-Chek Guide Me meter  Hypoglycemia handout  Ozempic savings card    SUBJECTIVE/OBJECTIVE:  Presents for:  "Individual review  Accompanied by: Self  Diabetes education in the past 24mo: Yes  Focus of Visit: Assistance w/ making life changes, Monitoring, Taking Medication  Diabetes type: Type 2  Date of diagnosis: Pt was dx with Type 2 DM >20 years ago but then had gastric bypass in 2005 and had no issues.  She has now regained weight and glucose trended up.  Disease course: Improving  Please elaborate:: As above  Diabetes management related comments/concerns: Pt states she has been working very hard on healthy food choices.  Disappointed she did not lose more weight.  Transportation concerns: No  Difficulty affording diabetes medication?: No  Difficulty affording diabetes testing supplies?: No (Dexcom G7 sensors cost 58.00/month.)  Other concerns:: None  Cultural Influences/Ethnic Background:  Choose not to answer    Diabetes Symptoms & Complications:  Diabetes Related Symptoms: None  Weight trend: Decreasing (Weight is down 1# in the past month, down 2# from initial visit.)  Symptom course: Stable  Disease course: Improving  Please elaborate:: As above  Complications assessed today?: Yes  CVA: No  Heart disease: No  Nephropathy: No    Patient Problem List and Family Medical History reviewed for relevant medical history, current medical status, and diabetes risk factors.    Vitals:  /82   Pulse 81   Resp 16   Ht 1.645 m (5' 4.75\")   Wt 120.5 kg (265 lb 11.2 oz)   SpO2 97%   BMI 44.56 kg/m    Estimated body mass index is 44.56 kg/m  as calculated from the following:    Height as of this encounter: 1.645 m (5' 4.75\").    Weight as of this encounter: 120.5 kg (265 lb 11.2 oz).   Last 3 BP:   BP Readings from Last 3 Encounters:   08/15/24 118/82   07/22/24 118/82   06/18/24 135/82       History   Smoking Status    Never   Smokeless Tobacco    Never       Labs:  No results found for: \"A1C\", \"HEMOGLOBINA1\"  Lab Results   Component Value Date     04/30/2022     No results found for: \"LDL\"  No results found " "for: \"HDL\"]  GFR Estimate   Date Value Ref Range Status   04/30/2022 >90 >60 mL/min/1.73m2 Final     Comment:     Effective December 21, 2021 eGFRcr in adults is calculated using the 2021 CKD-EPI creatinine equation which includes age and gender (Jazzmine aguirre al., [place], DOI: 10.1056/[id number])     No results found for: \"GFRESTBLACK\"  Lab Results   Component Value Date    CR 0.74 04/30/2022     No results found for: \"MICROALBUMIN\"    Healthy Eating:  Healthy Eating Assessed Today: Yes  Cultural/Worship diet restrictions?: No  Do you have any food allergies or intolerances?: No  Meal planning/habits: Low carb, Carb counting, Smaller portions, Other (Started watching carbohdyrates in April.)  Please elaborate:: More veggies  Who cooks/prepares meals for you?: Spouse  Who purchases food in  your home?: Spouse  How many times a week on average do you eat food made away from home (restaurant/take-out)?: 1  Meals include: Breakfast, Lunch, Dinner  Breakfast: Protein shake (protein liquid/protein powder/1/2 banana/1/4 cup oats/few nuts)  Lunch: sandwich or 1/2 sandwich (low carb bread), salad  Dinner: meat, salad or veggies  Snacks: HS-cheese or popcorn or Keto yogurt  Other: Pt has been very conscious regarding carbohydrate intake - even when dining out.  Beverages: Coffee, Diet soda, Water  Has patient met with a dietitian in the past?: Yes    Being Active:  Being Active Assessed Today: Yes  Exercise:: Yes (Uses exercise ball for her back.  Pt has been gardening much lately.)  Days per week of moderate to strenuous exercise (like a brisk walk): 6  On average, minutes per day of exercise at this level: 20  How intense was your typical exercise? : Light (like stretching or slow walking)  Exercise Minutes per Week: 120  Barrier to exercise: Physical limitation (arthritis in feet so walking hurts)    Monitoring:  Monitoring Assessed Today: Yes  Did patient bring glucose meter to appointment? : Yes  Blood Glucose Meter: CGM " (Dexcom G7)    Dexcom G7 AGP Report  08/02/2024 to 08/15/2024    % Time CGM is Active 98%    Average Glucose  129    Glucose Management Indicator  (GMI)    6.4%    Glucose Variabilty  23%    Time in Ranges:  >250 mg/dL   0%  181-250 mg/dL  7%   mg/dL   93%  54-69 mg/dL   0%  <54 mg/dL   <1%     Taking Medications:  Taking Medication Assessed Today: Yes  Current Treatments: Diet, Oral Medication (taken by mouth) (Metformin  mg daily.)  Problems taking diabetes medications regularly?: No  Diabetes medication side effects?: Yes (Pt states she has been urinating more since beginning Metformin ER.)    Problem Solving:  Problem Solving Assessed Today: Yes  Is the patient at risk for hypoglycemia?: No  Is the patient at risk for DKA?: No    Reducing Risks:  Reducing Risks Assessed Today: Yes  Diabetes Risks: Age over 45 years, Family History, Sedentary Lifestyle  CAD Risks: Diabetes Mellitus, Obesity, Sedentary lifestyle, Family history  Has dilated eye exam at least once a year?: Yes ([facility] in [facility] in [place])  Sees dentist every 6 months?: Yes  Feet checked by healthcare provider in the last year?: No    Healthy Coping:  Healthy Coping Assessed Today: Yes  Emotional response to diabetes: Acceptance, Concern for health and well-being, Ready to learn  Informal Support system:: Spouse  Stage of change: ACTION (Actively working towards change)  Patient Activation Measure Survey Score:       No data to display                Time Spent: 45 minutes  Encounter Type: Individual    Any diabetes medication dose changes were made via the CDE Protocol per the patient's referring provider. A copy of this encounter was shared with the provider.

## 2024-08-15 NOTE — PATIENT INSTRUCTIONS
-Keep working on healthy food choices - limit carbohydrates to 45 grams/meal, 15-30 grams/snack.  -Work on increasing exercise as able.   -Keep using your Dexcom G7 for monitoring.  If you have a low alarm use your meter to check.  If meter registers <70 or if you are feeling weak, shaky, dizzy, extreme hunger then you should treat your low glucose by eating 15 grams of fast acting carbohydrates - 1/2 cup juice or soda, few pieces of hard candy, 4 glucose tabs.  -If you need to you can purchase Relion meter and supplies over the counter at [facility].   -Follow up in October if you begin Ozempic.  If not then follow up Feb 2024.   -Weight today was 265# - down 2#.   -Your Dexcom data today looks great!  -Call with any concerns - ROBERTH Anguiano, [facility] [phone].

## 2024-08-15 NOTE — LETTER
8/15/2024      Josephine Wilkersonkhadijah  [address] [place] [address]        Diabetes Self-Management Education & Support    Presents for: Individual review    Type of Service: In Person Visit    ASSESSMENT:  Pt saw provider since our last visit and was started on Metformin  mg daily vs Ozempic.  Pt states provider felt insurance coverage would be better for Metformin vs Ozempic.  Dexcom download today notes improvement in glucose levels from last download - average glucose 129, GMI 6.4%, 93% glucose levels in target range.  Pt did have some low alarms but was asymptomatic.  Weight is down 2# since initial visit to [facility] 6/28/24.  Pt is frustrated as she states she is working very hard on limiting carbohydrates in her diet.      Patient's most recent A1c 6.9% (6/10/24).  A1c is meeting goal of <7.0    Diabetes knowledge and skills assessment:   Patient is knowledgeable in diabetes management concepts related to: Healthy Eating, Being Active, Monitoring, and Taking Medication    Continue education with the following diabetes management concepts: Taking Medication and Problem Solving.   Discussed mechanism of action of Metformin XR.  Encouraged pt to discuss increased urination with provider at visit.    Provided pt with a glucose meter to confirm low alarms.  Reviewed s/s hypoglycemia and treatment.     Based on learning assessment above, most appropriate setting for further diabetes education would be: Individual setting.      PLAN  Continue to work on healthy, low carbohydrate meal plan - 45 grams/meal, 15-30 grams/snack.  Increase exercise as able.  Consider trial of Ozempic if pt unable to lose weight by next provider visit.  Return for teaching if Ozempic will be initiated.      Follow-up: Pending Ozempic start.  If not then 6 months.     See Care Plan for co-developed, patient-state behavior change goals.  AVS provided for patient today.    Education Materials Provided:  Accu-Chek Guide Me  "meter  Hypoglycemia handout  Ozempic savings card    SUBJECTIVE/OBJECTIVE:  Presents for: Individual review  Accompanied by: Self  Diabetes education in the past 24mo: Yes  Focus of Visit: Assistance w/ making life changes, Monitoring, Taking Medication  Diabetes type: Type 2  Date of diagnosis: Pt was dx with Type 2 DM >20 years ago but then had gastric bypass in 2005 and had no issues.  She has now regained weight and glucose trended up.  Disease course: Improving  Please elaborate:: As above  Diabetes management related comments/concerns: Pt states she has been working very hard on healthy food choices.  Disappointed she did not lose more weight.  Transportation concerns: No  Difficulty affording diabetes medication?: No  Difficulty affording diabetes testing supplies?: No (Dexcom G7 sensors cost 58.00/month.)  Other concerns:: None  Cultural Influences/Ethnic Background:  Choose not to answer    Diabetes Symptoms & Complications:  Diabetes Related Symptoms: None  Weight trend: Decreasing (Weight is down 1# in the past month, down 2# from initial visit.)  Symptom course: Stable  Disease course: Improving  Please elaborate:: As above  Complications assessed today?: Yes  CVA: No  Heart disease: No  Nephropathy: No    Patient Problem List and Family Medical History reviewed for relevant medical history, current medical status, and diabetes risk factors.    Vitals:  /82   Pulse 81   Resp 16   Ht 1.645 m (5' 4.75\")   Wt 120.5 kg (265 lb 11.2 oz)   SpO2 97%   BMI 44.56 kg/m    Estimated body mass index is 44.56 kg/m  as calculated from the following:    Height as of this encounter: 1.645 m (5' 4.75\").    Weight as of this encounter: 120.5 kg (265 lb 11.2 oz).   Last 3 BP:   BP Readings from Last 3 Encounters:   08/15/24 118/82   07/22/24 118/82   06/18/24 135/82       History   Smoking Status     Never   Smokeless Tobacco     Never       Labs:  No results found for: \"A1C\", \"HEMOGLOBINA1\"  Lab Results " "  Component Value Date     04/30/2022     No results found for: \"LDL\"  No results found for: \"HDL\"]  GFR Estimate   Date Value Ref Range Status   04/30/2022 >90 >60 mL/min/1.73m2 Final     Comment:     Effective December 21, 2021 eGFRcr in adults is calculated using the 2021 CKD-EPI creatinine equation which includes age and gender (Jazzmine et al., [place], DOI: 10.1056/[id number])     No results found for: \"GFRESTBLACK\"  Lab Results   Component Value Date    CR 0.74 04/30/2022     No results found for: \"MICROALBUMIN\"    Healthy Eating:  Healthy Eating Assessed Today: Yes  Cultural/Restoration diet restrictions?: No  Do you have any food allergies or intolerances?: No  Meal planning/habits: Low carb, Carb counting, Smaller portions, Other (Started watching carbohdyrates in April.)  Please elaborate:: More veggies  Who cooks/prepares meals for you?: Spouse  Who purchases food in  your home?: Spouse  How many times a week on average do you eat food made away from home (restaurant/take-out)?: 1  Meals include: Breakfast, Lunch, Dinner  Breakfast: Protein shake (protein liquid/protein powder/1/2 banana/1/4 cup oats/few nuts)  Lunch: sandwich or 1/2 sandwich (low carb bread), salad  Dinner: meat, salad or veggies  Snacks: HS-cheese or popcorn or Keto yogurt  Other: Pt has been very conscious regarding carbohydrate intake - even when dining out.  Beverages: Coffee, Diet soda, Water  Has patient met with a dietitian in the past?: Yes    Being Active:  Being Active Assessed Today: Yes  Exercise:: Yes (Uses exercise ball for her back.  Pt has been gardening much lately.)  Days per week of moderate to strenuous exercise (like a brisk walk): 6  On average, minutes per day of exercise at this level: 20  How intense was your typical exercise? : Light (like stretching or slow walking)  Exercise Minutes per Week: 120  Barrier to exercise: Physical limitation (arthritis in feet so walking hurts)    Monitoring:  Monitoring " Assessed Today: Yes  Did patient bring glucose meter to appointment? : Yes  Blood Glucose Meter: CGM (Dexcom G7)    Dexcom G7 AGP Report  08/02/2024 to 08/15/2024    % Time CGM is Active 98%    Average Glucose  129    Glucose Management Indicator  (GMI)    6.4%    Glucose Variabilty  23%    Time in Ranges:  >250 mg/dL   0%  181-250 mg/dL  7%   mg/dL   93%  54-69 mg/dL   0%  <54 mg/dL   <1%     Taking Medications:  Taking Medication Assessed Today: Yes  Current Treatments: Diet, Oral Medication (taken by mouth) (Metformin  mg daily.)  Problems taking diabetes medications regularly?: No  Diabetes medication side effects?: Yes (Pt states she has been urinating more since beginning Metformin ER.)    Problem Solving:  Problem Solving Assessed Today: Yes  Is the patient at risk for hypoglycemia?: No  Is the patient at risk for DKA?: No    Reducing Risks:  Reducing Risks Assessed Today: Yes  Diabetes Risks: Age over 45 years, Family History, Sedentary Lifestyle  CAD Risks: Diabetes Mellitus, Obesity, Sedentary lifestyle, Family history  Has dilated eye exam at least once a year?: Yes ([facility] in [facility] in [place])  Sees dentist every 6 months?: Yes  Feet checked by healthcare provider in the last year?: No    Healthy Coping:  Healthy Coping Assessed Today: Yes  Emotional response to diabetes: Acceptance, Concern for health and well-being, Ready to learn  Informal Support system:: Spouse  Stage of change: ACTION (Actively working towards change)  Patient Activation Measure Survey Score:       No data to display                Time Spent: 45 minutes  Encounter Type: Individual    Any diabetes medication dose changes were made via the CDE Protocol per the patient's referring provider. A copy of this encounter was shared with the provider.       Sincerely,        Elizabeth Cannon RD

## 2024-09-30 ENCOUNTER — HOSPITAL ENCOUNTER (OUTPATIENT)
Dept: EDUCATION SERVICES | Facility: HOSPITAL | Age: 59
Discharge: HOME | End: 2024-09-30
Attending: DIETITIAN, REGISTERED | Admitting: DIETITIAN, REGISTERED
Payer: COMMERCIAL

## 2024-09-30 VITALS
OXYGEN SATURATION: 97 % | RESPIRATION RATE: 16 BRPM | HEART RATE: 82 BPM | DIASTOLIC BLOOD PRESSURE: 77 MMHG | BODY MASS INDEX: 43.5 KG/M2 | SYSTOLIC BLOOD PRESSURE: 114 MMHG | HEIGHT: 65 IN | WEIGHT: 261.1 LBS

## 2024-09-30 DIAGNOSIS — E11.9 TYPE 2 DIABETES MELLITUS WITHOUT COMPLICATION, WITHOUT LONG-TERM CURRENT USE OF INSULIN (H): Primary | ICD-10-CM

## 2024-09-30 PROCEDURE — G0108 DIAB MANAGE TRN  PER INDIV: HCPCS | Performed by: DIETITIAN, REGISTERED

## 2024-09-30 ASSESSMENT — PAIN SCALES - GENERAL: PAINLEVEL: SEVERE PAIN (7)

## 2024-09-30 NOTE — PATIENT INSTRUCTIONS
-Keep working on healthy food choices.    -Keep up with exercise in the winter months.   Goal is 150 minutes weekly.  -Consider seeing podiatry for your feet if needed.   -Use the Clarity santy to review your AGP report and compare to goals.   -Speak with Babs about possibly increasing your Metformin or Ozempic.   -Weight today was 261# - down 6# since June!  Keep working at it.  -Follow up in February or sooner if meds change.  -Feel free to My Chart any questions or concerns.  -ROBERTH Anguiano, [facility] [phone].

## 2024-09-30 NOTE — PROGRESS NOTES
Diabetes Self-Management Education & Support    Presents for: Individual review    Type of Service: In Person Visit    ASSESSMENT:  Pt is here today to review her Dexcom G7 report.  She feels her glucose levels have trended up some since increasing her Metformin and she is having some high glucose levels she cannot relate to food choices.  Download today notes average glucose 152, GMI 6.9%, 84% glucose levels in range ().  Previous download in August noted average glucose of 129, GMI 6.4%, 93% glucose levels in range.  Unsure why they would trend up with increasing medication.  Regardless, glucose levels still in acceptable target.   Discussion notes she has been working very hard on healthy food choices as she desires weight loss.  Weight is down 6# since initial visit to [facility] in June.  GLP1 use deferred to provider r/t hx of some sort of pancreas issue.      Patient's most recent A1c was 6.9% (6/10/24). A1c is meeting goal of <7.0    Diabetes knowledge and skills assessment:   Patient is knowledgeable in diabetes management concepts related to: Healthy Eating, Being Active, and Taking Medication    Education today focused on the following diabetes management concepts: CGM use.  Reviewed CGM goals, Dexcom accuracy and reasons for differences between sensor/meter glucose.      Based on learning assessment above, most appropriate setting for further diabetes education would be: Individual setting.      PLAN  Continue to work on healthy food choices.   Keep walking/use pedal machine - goal 150 minutes weekly.  Pt will speak with provider regarding possible increase in dose of Metformin vs GLP1.      Follow-up: Feb 2025 unless med change sooner.  Pt will My Chart question/concerns.     See Care Plan for co-developed, patient-state behavior change goals.  AVS provided for patient today.    Education Materials Provided:  CGM goal sheet  Dexcom Accuray:  BG meter vs CGM sensor    SUBJECTIVE/OBJECTIVE:  Presents for:  "Individual review  Accompanied by: Self  Diabetes education in the past 24mo: Yes  Focus of Visit: Assistance w/ making life changes, CGM  Diabetes type: Type 2  Date of diagnosis: Pt was dx with Type 2 DM >20 years ago but then had gastric bypass in 2005 and had no issues.  She has now regained weight and glucose trended up.  Disease course: Stable  How confident are you filling out medical forms by yourself:: Extremely  Diabetes management related comments/concerns: My blood sugars are still all over the place even with my dosage increase  Transportation concerns: No  Difficulty affording diabetes medication?: No  Difficulty affording diabetes testing supplies?: No  Other concerns:: None  Cultural Influences/Ethnic Background:  Choose not to answer    Diabetes Symptoms & Complications:  Diabetes Related Symptoms: Polyuria (increased urination)  Weight trend: Decreasing (Weight is down 6# since initial visit to [facility] in June.)  Symptom course: Worsening  Disease course: Stable  Complications assessed today?: Yes  CVA: No  Heart disease: No  Nephropathy: No    Patient Problem List and Family Medical History reviewed for relevant medical history, current medical status, and diabetes risk factors.    Vitals:  /77   Pulse 82   Resp 16   Ht 1.645 m (5' 4.75\")   Wt 118.4 kg (261 lb 1.6 oz)   SpO2 97%   BMI 43.79 kg/m    Estimated body mass index is 43.79 kg/m  as calculated from the following:    Height as of this encounter: 1.645 m (5' 4.75\").    Weight as of this encounter: 118.4 kg (261 lb 1.6 oz).   Last 3 BP:   BP Readings from Last 3 Encounters:   09/30/24 114/77   08/15/24 118/82   07/22/24 118/82       History   Smoking Status    Never   Smokeless Tobacco    Never       Labs:  No results found for: \"A1C\", \"HEMOGLOBINA1\"  Lab Results   Component Value Date     04/30/2022     No results found for: \"LDL\"  No results found for: \"HDL\"]  GFR Estimate   Date Value Ref Range Status   04/30/2022 >90 >60 " "mL/min/1.73m2 Final     Comment:     Effective December 21, 2021 eGFRcr in adults is calculated using the 2021 CKD-EPI creatinine equation which includes age and gender (Jazzmine et al., [place], DOI: 10.1056/BJSDxn5739868)     No results found for: \"GFRESTBLACK\"  Lab Results   Component Value Date    CR 0.74 04/30/2022     No results found for: \"MICROALBUMIN\"    Healthy Eating:  Healthy Eating Assessed Today: Yes  Cultural/Episcopal diet restrictions?: No  Do you have any food allergies or intolerances?: Yes  Please list your food allergies / intolerances: Hazelnuts  Meal planning/habits: Low carb, Carb counting, Smaller portions  Who cooks/prepares meals for you?: Spouse  Who purchases food in  your home?: Spouse  How many times a week on average do you eat food made away from home (restaurant/take-out)?: 4  Meals include: Breakfast, Lunch, Dinner, Evening Snack  Other: Pt is limiting carbohydrates to 45 grams/meal and snacks to 15-30 grams.  Beverages: Water, Coffee, Diet soda  Has patient met with a dietitian in the past?: Yes    Being Active:  Being Active Assessed Today: Yes  Exercise:: Yes (Some walking and using exercise ball.  Just purchased a pedal machine.)  Barrier to exercise: Physical limitation (Feet hurt)    Monitoring:  Monitoring Assessed Today: Yes  Did patient bring glucose meter to appointment? : Yes  Blood Glucose Meter: Accu-chek, CGM (Dexcom G7)    Dexcom G7 [facility] Report  09/17/2024 to 09/30/2024    % Time CGM is Active 100%    Average Glucose  152    Glucose Management Indicator  (GMI)    6.9%    Glucose Variabilty  22%    Time in Ranges:  >250 mg/dL   1%  181-250 mg/dL  15%   mg/dL   84%  54-69 mg/dL   0%  <54 mg/dL   0%     Taking Medications:  Taking Medication Assessed Today: Yes  Current Treatments: Diet, Oral Medication (taken by mouth) (Metformin ER 1000 mg daily)  Problems taking diabetes medications regularly?: No  Diabetes medication side effects?: No    Problem Solving:  Problem " Solving Assessed Today: Yes  Is the patient at risk for hypoglycemia?: No  Is the patient at risk for DKA?: No    Reducing Risks:  Reducing Risks Assessed Today: Yes  Diabetes Risks: Age over 45 years, Family History, Sedentary Lifestyle  CAD Risks: Diabetes Mellitus, Family history, Obesity, Sedentary lifestyle  Has dilated eye exam at least once a year?: Yes  Sees dentist every 6 months?: Yes  Feet checked by healthcare provider in the last year?: No    Healthy Coping:  Emotional response to diabetes: Acceptance, Concern for health and well-being, Ready to learn  Informal Support system:: Children, Friends, Spouse  Stage of change: ACTION (Actively working towards change)  Patient Activation Measure Survey Score:       No data to display                Time Spent: 45 minutes  Encounter Type: Individual    Any diabetes medication dose changes were made via the CDE Protocol per the patient's referring provider. A copy of this encounter was shared with the provider.

## 2024-09-30 NOTE — LETTER
9/30/2024      Josephine Liu  [address] [place] [address]        Diabetes Self-Management Education & Support    Presents for: Individual review    Type of Service: In Person Visit    ASSESSMENT:  Pt is here today to review her Dexcom G7 report.  She feels her glucose levels have trended up some since increasing her Metformin and she is having some high glucose levels she cannot relate to food choices.  Download today notes average glucose 152, GMI 6.9%, 84% glucose levels in range ().  Previous download in August noted average glucose of 129, GMI 6.4%, 93% glucose levels in range.  Unsure why they would trend up with increasing medication.  Regardless, glucose levels still in acceptable target.   Discussion notes she has been working very hard on healthy food choices as she desires weight loss.  Weight is down 6# since initial visit to [facility] in June.  GLP1 use deferred to provider r/t hx of some sort of pancreas issue.      Patient's most recent A1c was 6.9% (6/10/24). A1c is meeting goal of <7.0    Diabetes knowledge and skills assessment:   Patient is knowledgeable in diabetes management concepts related to: Healthy Eating, Being Active, and Taking Medication    Education today focused on the following diabetes management concepts: CGM use.  Reviewed CGM goals, Dexcom accuracy and reasons for differences between sensor/meter glucose.      Based on learning assessment above, most appropriate setting for further diabetes education would be: Individual setting.      PLAN  Continue to work on healthy food choices.   Keep walking/use pedal machine - goal 150 minutes weekly.  Pt will speak with provider regarding possible increase in dose of Metformin vs GLP1.      Follow-up: Feb 2025 unless med change sooner.  Pt will My Chart question/concerns.     See Care Plan for co-developed, patient-state behavior change goals.  AVS provided for patient today.    Education Materials Provided:  CGM goal  "sheet  Dexcom Accuray:  BG meter vs CGM sensor    SUBJECTIVE/OBJECTIVE:  Presents for: Individual review  Accompanied by: Self  Diabetes education in the past 24mo: Yes  Focus of Visit: Assistance w/ making life changes, CGM  Diabetes type: Type 2  Date of diagnosis: Pt was dx with Type 2 DM >20 years ago but then had gastric bypass in 2005 and had no issues.  She has now regained weight and glucose trended up.  Disease course: Stable  How confident are you filling out medical forms by yourself:: Extremely  Diabetes management related comments/concerns: My blood sugars are still all over the place even with my dosage increase  Transportation concerns: No  Difficulty affording diabetes medication?: No  Difficulty affording diabetes testing supplies?: No  Other concerns:: None  Cultural Influences/Ethnic Background:  Choose not to answer    Diabetes Symptoms & Complications:  Diabetes Related Symptoms: Polyuria (increased urination)  Weight trend: Decreasing (Weight is down 6# since initial visit to [facility] in June.)  Symptom course: Worsening  Disease course: Stable  Complications assessed today?: Yes  CVA: No  Heart disease: No  Nephropathy: No    Patient Problem List and Family Medical History reviewed for relevant medical history, current medical status, and diabetes risk factors.    Vitals:  /77   Pulse 82   Resp 16   Ht 1.645 m (5' 4.75\")   Wt 118.4 kg (261 lb 1.6 oz)   SpO2 97%   BMI 43.79 kg/m    Estimated body mass index is 43.79 kg/m  as calculated from the following:    Height as of this encounter: 1.645 m (5' 4.75\").    Weight as of this encounter: 118.4 kg (261 lb 1.6 oz).   Last 3 BP:   BP Readings from Last 3 Encounters:   09/30/24 114/77   08/15/24 118/82   07/22/24 118/82       History   Smoking Status     Never   Smokeless Tobacco     Never       Labs:  No results found for: \"A1C\", \"HEMOGLOBINA1\"  Lab Results   Component Value Date     04/30/2022     No results found for: \"LDL\"  No " "results found for: \"HDL\"]  GFR Estimate   Date Value Ref Range Status   04/30/2022 >90 >60 mL/min/1.73m2 Final     Comment:     Effective December 21, 2021 eGFRcr in adults is calculated using the 2021 CKD-EPI creatinine equation which includes age and gender (Jazzmine aguirre al., [place], DOI: 10.1056/RXQXso2666768)     No results found for: \"GFRESTBLACK\"  Lab Results   Component Value Date    CR 0.74 04/30/2022     No results found for: \"MICROALBUMIN\"    Healthy Eating:  Healthy Eating Assessed Today: Yes  Cultural/Zoroastrianism diet restrictions?: No  Do you have any food allergies or intolerances?: Yes  Please list your food allergies / intolerances: Hazelnuts  Meal planning/habits: Low carb, Carb counting, Smaller portions  Who cooks/prepares meals for you?: Spouse  Who purchases food in  your home?: Spouse  How many times a week on average do you eat food made away from home (restaurant/take-out)?: 4  Meals include: Breakfast, Lunch, Dinner, Evening Snack  Other: Pt is limiting carbohydrates to 45 grams/meal and snacks to 15-30 grams.  Beverages: Water, Coffee, Diet soda  Has patient met with a dietitian in the past?: Yes    Being Active:  Being Active Assessed Today: Yes  Exercise:: Yes (Some walking and using exercise ball.  Just purchased a pedal machine.)  Barrier to exercise: Physical limitation (Feet hurt)    Monitoring:  Monitoring Assessed Today: Yes  Did patient bring glucose meter to appointment? : Yes  Blood Glucose Meter: Accu-chek, CGM (Dexcom G7)    Dexcom G7 [facility] Report  09/17/2024 to 09/30/2024    % Time CGM is Active 100%    Average Glucose  152    Glucose Management Indicator  (GMI)    6.9%    Glucose Variabilty  22%    Time in Ranges:  >250 mg/dL   1%  181-250 mg/dL  15%   mg/dL   84%  54-69 mg/dL   0%  <54 mg/dL   0%     Taking Medications:  Taking Medication Assessed Today: Yes  Current Treatments: Diet, Oral Medication (taken by mouth) (Metformin ER 1000 mg daily)  Problems taking diabetes " medications regularly?: No  Diabetes medication side effects?: No    Problem Solving:  Problem Solving Assessed Today: Yes  Is the patient at risk for hypoglycemia?: No  Is the patient at risk for DKA?: No    Reducing Risks:  Reducing Risks Assessed Today: Yes  Diabetes Risks: Age over 45 years, Family History, Sedentary Lifestyle  CAD Risks: Diabetes Mellitus, Family history, Obesity, Sedentary lifestyle  Has dilated eye exam at least once a year?: Yes  Sees dentist every 6 months?: Yes  Feet checked by healthcare provider in the last year?: No    Healthy Coping:  Emotional response to diabetes: Acceptance, Concern for health and well-being, Ready to learn  Informal Support system:: Children, Friends, Spouse  Stage of change: ACTION (Actively working towards change)  Patient Activation Measure Survey Score:       No data to display                Time Spent: 45 minutes  Encounter Type: Individual    Any diabetes medication dose changes were made via the CDE Protocol per the patient's referring provider. A copy of this encounter was shared with the provider.       Sincerely,        Elizabeth Cannon RD

## 2024-10-07 ENCOUNTER — TRANSFERRED RECORDS (OUTPATIENT)
Dept: HEALTH INFORMATION MANAGEMENT | Facility: CLINIC | Age: 59
End: 2024-10-07

## 2024-11-23 ENCOUNTER — HEALTH MAINTENANCE LETTER (OUTPATIENT)
Age: 59
End: 2024-11-23

## 2024-12-04 ENCOUNTER — TELEPHONE (OUTPATIENT)
Dept: EDUCATION SERVICES | Facility: OTHER | Age: 59
End: 2024-12-04

## 2024-12-04 NOTE — TELEPHONE ENCOUNTER
Attempt # 1  Outcome: Left Message   Comment: LVM for pt to call and schedule annual visit with MIGNON Cannon in February 2025.

## 2024-12-04 NOTE — TELEPHONE ENCOUNTER
----- Message from Elizabeth Cannon sent at 12/4/2024 12:57 PM CST -----  Please call pt to schedule follow up for glucose review February 2025.    Thanks!

## 2024-12-06 NOTE — TELEPHONE ENCOUNTER
Attempt # 2  Outcome: Left Message   Comment: LVM for pt to call and schedule Annual visit in February 2025 with MIGNON Cannon.

## 2024-12-07 DIAGNOSIS — E11.9 TYPE 2 DIABETES MELLITUS WITHOUT COMPLICATION, WITHOUT LONG-TERM CURRENT USE OF INSULIN (H): ICD-10-CM

## 2024-12-09 NOTE — TELEPHONE ENCOUNTER
Dexcom sensor  Last Written Prescription Date:  06/18/24  Last Fill Quantity: 3,   # refills: 5  Last Office Visit: 09/30/24  Future Office visit:

## 2024-12-16 RX ORDER — ACYCLOVIR 400 MG/1
TABLET ORAL
Qty: 3 EACH | Refills: 5 | Status: SHIPPED | OUTPATIENT
Start: 2024-12-16

## 2025-01-24 ENCOUNTER — TRANSFERRED RECORDS (OUTPATIENT)
Dept: HEALTH INFORMATION MANAGEMENT | Facility: CLINIC | Age: 60
End: 2025-01-24

## 2025-02-10 ENCOUNTER — HOSPITAL ENCOUNTER (OUTPATIENT)
Dept: EDUCATION SERVICES | Facility: HOSPITAL | Age: 60
Discharge: HOME | End: 2025-02-10
Attending: DIETITIAN, REGISTERED | Admitting: DIETITIAN, REGISTERED
Payer: COMMERCIAL

## 2025-02-10 VITALS
HEIGHT: 65 IN | HEART RATE: 93 BPM | SYSTOLIC BLOOD PRESSURE: 109 MMHG | OXYGEN SATURATION: 97 % | WEIGHT: 238.1 LBS | DIASTOLIC BLOOD PRESSURE: 77 MMHG | BODY MASS INDEX: 39.67 KG/M2 | RESPIRATION RATE: 16 BRPM

## 2025-02-10 DIAGNOSIS — E11.9 TYPE 2 DIABETES MELLITUS WITHOUT COMPLICATION, WITHOUT LONG-TERM CURRENT USE OF INSULIN (H): Primary | ICD-10-CM

## 2025-02-10 PROCEDURE — G0108 DIAB MANAGE TRN  PER INDIV: HCPCS | Performed by: DIETITIAN, REGISTERED

## 2025-02-10 RX ORDER — SEMAGLUTIDE 0.68 MG/ML
0.5 INJECTION, SOLUTION SUBCUTANEOUS
COMMUNITY
Start: 2025-02-05

## 2025-02-10 ASSESSMENT — PAIN SCALES - GENERAL: PAINLEVEL_OUTOF10: MODERATE PAIN (5)

## 2025-02-10 NOTE — PATIENT INSTRUCTIONS
-Keep working on healthy food choices and making sure you include protein and carbohydrates at all meals/snacks.   -Increase your exercise as the weather improves.  Goal is at least 150 minutes weekly.  -Okay to use just use Dexcom periodically or resume using the glucose meter.  -We like most glucose levels to be .  Fasting and before meals , after meals < 180.  -Recent A1c was 5.5% - great job!   Goal is < 7.0%.  -Continue current dose of Ozempic.  Call if you desire increase for weight loss benefit  -Schedule eye exam when you can.    -Follow up in six months.  -ROBERTH Anguiano, [facility] [phone].

## 2025-02-10 NOTE — PROGRESS NOTES
"Diabetes Self-Management Education & Support    Presents for: Individual review    Type of Service: In Person Visit    Assessment  Recent A1c in target at 5.5%.  Weight is down 29# since June 2024 with use of Ozempic.  Pt is at 0.50 mg weekly dose and feels she is still losing weight at this point and glucose levels are controlled. She did have nausea and feelings of being unwell when she increased to 0.50 mg dose but resolved now.  She has not been doing any routine exercise but feels she will once weather improves.  Foot exam is up to date.  Eye exam is due - discussed.      Patient's most recent was 5.5% (1/24/25) at [facility].  \"HEMOGLOBINA1\"  is meeting goal of <7.0    Diabetes knowledge and skills assessment:   Patient is knowledgeable in diabetes management concepts related to: Healthy Eating, Being Active, Monitoring, Taking Medication, Problem Solving, Reducing Risks, and Healthy Coping    Based on learning assessment above, most appropriate setting for further diabetes education would be: Individual setting.    Care Plan and Education Provided:  Will continue to educate on diabetes management concepts as indicated.      Patient verbalized understanding of diabetes self-management education concepts discussed, opportunities for ongoing education and support, and recommendations provided today.    Plan  Continue to work on healthy food choices.   Try to begin some routine exercise.   Okay to periodically use Dexcom G7 or transition to fingersticks if desired.   Will consider increased dose of Ozempic in the future if weight loss slows.   Encouraged pt to schedule eye exam.       Follow-up:  Six months     See Care Plan for co-developed, patient-state behavior change goals.    Education Materials Provided:  No new materials today.     Subjective/Objective  Josephine is an 60 year old year old, presenting for the following diabetes education related to: Presents for: Individual review  Accompanied by: " "Self  Diabetes education in the past 24mo: Yes  Focus of Visit: Assistance w/ making life changes  Diabetes type: Type 2  Date of diagnosis: Pt was dx with Type 2 DM >20 years ago but then had gastric bypass in 2005 and had no issues.  She has now regained weight and glucose trended up.  Disease course: Stable  How confident are you filling out medical forms by yourself:: Extremely  Diabetes management related comments/concerns: Pt questions if she needs to continue to use Dexcom G7 r/t cost.  Transportation concerns: No  Difficulty affording diabetes medication?: No  Difficulty affording diabetes testing supplies?: No  Other concerns:: None  Cultural Influences/Ethnic Background:  Choose not to answer    Diabetes Symptoms & Complications:  Diabetes Related Symptoms: None  Weight trend: Decreasing (Weight is down 23# since last visit to [facility] Sept 2024, down 29# since June 2024.)  Symptom course: Improving  Disease course: Stable  Complications assessed today?: Yes  CVA: No  Heart disease: No  Nephropathy: No    Patient Problem List and Family Medical History reviewed for relevant medical history, current medical status, and diabetes risk factors.    Vitals:  /77   Pulse 93   Resp 16   Ht 1.645 m (5' 4.75\")   Wt 108 kg (238 lb 1.6 oz)   SpO2 97%   BMI 39.93 kg/m    Estimated body mass index is 39.93 kg/m  as calculated from the following:    Height as of this encounter: 1.645 m (5' 4.75\").    Weight as of this encounter: 108 kg (238 lb 1.6 oz).   Last 3 BP:   BP Readings from Last 3 Encounters:   02/10/25 109/77   09/30/24 114/77   08/15/24 118/82       History   Smoking Status    Never   Smokeless Tobacco    Never       Labs:  No results found for: \"A1C\", \"HEMOGLOBINA1\"  Lab Results   Component Value Date     04/30/2022     No results found for: \"LDL\"  No results found for: \"HDL\"]  GFR Estimate   Date Value Ref Range Status   04/30/2022 >90 >60 mL/min/1.73m2 Final     Comment:     Effective " "December 21, 2021 eGFRcr in adults is calculated using the 2021 CKD-EPI creatinine equation which includes age and gender (Jazzmine et al., [place], DOI: 10.1056/IJUSdf7009541)     No results found for: \"GFRESTBLACK\"  Lab Results   Component Value Date    CR 0.74 04/30/2022     No results found for: \"MICROALBUMIN\"    Healthy Eating:  Healthy Eating Assessed Today: Yes  Cultural/Restoration diet restrictions?: No  Do you have any food allergies or intolerances?: Yes  Please list your food allergies / intolerances: Hazelnuts  Meal planning/habits: Low carb, Smaller portions  Who cooks/prepares meals for you?: Spouse  Who purchases food in  your home?: Spouse  How many times a week on average do you eat food made away from home (restaurant/take-out)?: 1  Meals include: Breakfast, Lunch, Dinner, Evening Snack  Breakfast: Protein Shake on weekdays or 1 egg with veggies/ham/cheese on weekends  Lunch: sandwich - lunchmeat, lettuce, cheese, onion, green pepper  Dinner: 2 slices thin crust pizza or 1 taco and maybe a little extra made into a salad or 1/2 hamburger or  salad or chicken sandwich  Snacks: HS - popcorn or nuts or cheese or yogurt or cottage cheese with fruit  Other: Pt reports less appetite with use of Ozempic.  Beverages: Coffee, Diet soda, Other (see Comments)  Please elaborate:: flavored sparking water  Has patient met with a dietitian in the past?: Yes    Being Active:  Being Active Assessed Today: Yes  Exercise:: Currently not exercising (No routine exercise.  Some back exercises using exercise ball.)  Barrier to exercise: None    Monitoring:  Monitoring Assessed Today: Yes  Did patient bring glucose meter to appointment? : Yes  Blood Glucose Meter: Accu-chek, CGM (Dexcom G7)    Dexcom G7 [facility] Report  01/28/2025 to 02/10/2025    % Time CGM is Active 97%    Average Glucose  119    Glucose Management Indicator  (GMI)    6.2%    Glucose Variabilty  25%    Time in Ranges:  >250 mg/dL   1%  181-250 mg/dL  4%   " mg/dL   95%  54-69 mg/dL   0%  <54 mg/dL   <1%     Taking Medications:  Diabetes Medication(s)       Incretin Mimetic Agents       OZEMPIC, 0.25 OR 0.5 MG/DOSE, 2 MG/3ML pen Inject 0.5 mg subcutaneously every 7 days.          Taking Medication Assessed Today: Yes  Current Treatments: Diet, Oral Medication (taken by mouth) (Ozempic 0.50 mg weekly (Wednesday))  Problems taking diabetes medications regularly?: No  Diabetes medication side effects?: No (Pt did have some nausea when she increased dose of Ozempic to 0.50 mg weekly but resolved now.)    Problem Solving:  Problem Solving Assessed Today: Yes  Is the patient at risk for hypoglycemia?: No  Is the patient at risk for DKA?: No        Reducing Risks:  Reducing Risks Assessed Today: Yes  Diabetes Risks: Age over 45 years, Family History, Sedentary Lifestyle  CAD Risks: Diabetes Mellitus, Family history, Obesity, Sedentary lifestyle  Has dilated eye exam at least once a year?: No  Sees dentist every 6 months?: Yes  Feet checked by healthcare provider in the last year?: Yes (10/7/24)    Healthy Coping:  Healthy Coping Assessed Today: Yes  Emotional response to diabetes: Acceptance, Concern for health and well-being, Ready to learn  Informal Support system:: Children, Friends, Spouse  Stage of change: MAINTENANCE (Working to maintain change, with risk of relapse)    ROBERTH Anguiano, [facility]  Time Spent: 45 minutes  Encounter Type: Individual    Any diabetes medication dose changes were made via the [facility] Standing Orders under the patient's referring provider.

## 2025-04-24 ENCOUNTER — OFFICE VISIT (OUTPATIENT)
Dept: FAMILY MEDICINE | Facility: OTHER | Age: 60
End: 2025-04-24
Attending: STUDENT IN AN ORGANIZED HEALTH CARE EDUCATION/TRAINING PROGRAM
Payer: COMMERCIAL

## 2025-04-24 VITALS
BODY MASS INDEX: 38.24 KG/M2 | TEMPERATURE: 98.5 F | OXYGEN SATURATION: 96 % | HEIGHT: 65 IN | DIASTOLIC BLOOD PRESSURE: 72 MMHG | WEIGHT: 229.5 LBS | HEART RATE: 104 BPM | SYSTOLIC BLOOD PRESSURE: 108 MMHG

## 2025-04-24 DIAGNOSIS — Z76.89 ENCOUNTER TO ESTABLISH CARE: Primary | ICD-10-CM

## 2025-04-24 DIAGNOSIS — G25.81 RESTLESS LEG SYNDROME: ICD-10-CM

## 2025-04-24 DIAGNOSIS — Z98.84 GASTRIC BYPASS STATUS FOR OBESITY: Chronic | ICD-10-CM

## 2025-04-24 DIAGNOSIS — R79.89 ELEVATED FERRITIN: ICD-10-CM

## 2025-04-24 DIAGNOSIS — E03.9 HYPOTHYROIDISM, UNSPECIFIED TYPE: ICD-10-CM

## 2025-04-24 DIAGNOSIS — E11.9 TYPE 2 DIABETES MELLITUS WITHOUT COMPLICATION, WITHOUT LONG-TERM CURRENT USE OF INSULIN (H): ICD-10-CM

## 2025-04-24 DIAGNOSIS — F33.42 RECURRENT MAJOR DEPRESSIVE DISORDER, IN FULL REMISSION: ICD-10-CM

## 2025-04-24 DIAGNOSIS — Z23 ENCOUNTER FOR IMMUNIZATION: ICD-10-CM

## 2025-04-24 DIAGNOSIS — G47.00 INSOMNIA, UNSPECIFIED TYPE: ICD-10-CM

## 2025-04-24 LAB
ANION GAP SERPL CALCULATED.3IONS-SCNC: 11 MMOL/L (ref 7–15)
BUN SERPL-MCNC: 12.8 MG/DL (ref 8–23)
CALCIUM SERPL-MCNC: 9.1 MG/DL (ref 8.8–10.4)
CHLORIDE SERPL-SCNC: 106 MMOL/L (ref 98–107)
CREAT SERPL-MCNC: 0.75 MG/DL (ref 0.51–0.95)
EGFRCR SERPLBLD CKD-EPI 2021: 90 ML/MIN/1.73M2
EST. AVERAGE GLUCOSE BLD GHB EST-MCNC: 117 MG/DL
FERRITIN SERPL-MCNC: 495 NG/ML (ref 11–328)
GLUCOSE SERPL-MCNC: 117 MG/DL (ref 70–99)
HBA1C MFR BLD: 5.7 %
HCO3 SERPL-SCNC: 23 MMOL/L (ref 22–29)
IRON BINDING CAPACITY (ROCHE): 247 UG/DL (ref 240–430)
IRON SATN MFR SERPL: 62 % (ref 15–46)
IRON SERPL-MCNC: 154 UG/DL (ref 37–145)
POTASSIUM SERPL-SCNC: 4.2 MMOL/L (ref 3.4–5.3)
SODIUM SERPL-SCNC: 140 MMOL/L (ref 135–145)
T4 FREE SERPL-MCNC: 1.51 NG/DL (ref 0.9–1.7)
TSH SERPL DL<=0.005 MIU/L-ACNC: 0.19 UIU/ML (ref 0.3–4.2)

## 2025-04-24 RX ORDER — DULOXETIN HYDROCHLORIDE 60 MG/1
60 CAPSULE, DELAYED RELEASE ORAL 2 TIMES DAILY
Qty: 180 CAPSULE | Refills: 1 | Status: SHIPPED | OUTPATIENT
Start: 2025-04-24

## 2025-04-24 RX ORDER — ROPINIROLE 0.25 MG/1
0.25 TABLET, FILM COATED ORAL AT BEDTIME
Qty: 90 TABLET | Refills: 1 | Status: SHIPPED | OUTPATIENT
Start: 2025-04-24

## 2025-04-24 RX ORDER — TRAZODONE HYDROCHLORIDE 100 MG/1
100 TABLET ORAL AT BEDTIME
Qty: 90 TABLET | Refills: 1 | Status: SHIPPED | OUTPATIENT
Start: 2025-04-24

## 2025-04-24 RX ORDER — FAMOTIDINE 20 MG
2000 TABLET ORAL
COMMUNITY

## 2025-04-24 RX ORDER — CYANOCOBALAMIN (VITAMIN B-12) 2500 MCG
2500 TABLET, SUBLINGUAL SUBLINGUAL
COMMUNITY

## 2025-04-24 ASSESSMENT — ANXIETY QUESTIONNAIRES
7. FEELING AFRAID AS IF SOMETHING AWFUL MIGHT HAPPEN: NOT AT ALL
IF YOU CHECKED OFF ANY PROBLEMS ON THIS QUESTIONNAIRE, HOW DIFFICULT HAVE THESE PROBLEMS MADE IT FOR YOU TO DO YOUR WORK, TAKE CARE OF THINGS AT HOME, OR GET ALONG WITH OTHER PEOPLE: NOT DIFFICULT AT ALL
GAD7 TOTAL SCORE: 2
5. BEING SO RESTLESS THAT IT IS HARD TO SIT STILL: NOT AT ALL
6. BECOMING EASILY ANNOYED OR IRRITABLE: SEVERAL DAYS
3. WORRYING TOO MUCH ABOUT DIFFERENT THINGS: NOT AT ALL
1. FEELING NERVOUS, ANXIOUS, OR ON EDGE: SEVERAL DAYS
2. NOT BEING ABLE TO STOP OR CONTROL WORRYING: NOT AT ALL
GAD7 TOTAL SCORE: 2

## 2025-04-24 ASSESSMENT — PAIN SCALES - GENERAL: PAINLEVEL_OUTOF10: NO PAIN (0)

## 2025-04-24 ASSESSMENT — PATIENT HEALTH QUESTIONNAIRE - PHQ9
5. POOR APPETITE OR OVEREATING: NOT AT ALL
SUM OF ALL RESPONSES TO PHQ QUESTIONS 1-9: 1

## 2025-04-24 NOTE — PATIENT INSTRUCTIONS
To help keep bones strong, in addition to routine cardio exercise and weight based exercise, you should get 1000 units Vitamin D daily and 1200 mg of Calcium from diet AND supplement COMBINED. Calcium should be taken in two divided doses, max dose at one time is 600mg. These can be found in women's multivitamins or as an additional supplement. Please check the label to ensure that your vitamin has enough of each and also check what you are eating to determine calcium supplement needed.    Foods and drinks with calcium  Food Calcium in milligrams   Milk (skim, 2%, or whole; 8 oz [240 mL]) 300   Yogurt (6 oz [168 g]) 250   Orange juice (with calcium; 8 oz [240 mL]) 300   Tofu with calcium (0.5 cup [113 g]) 435   Cheese (1 oz [28 g]) 195 to 335 (hard cheese = higher calcium)   Cottage cheese (0.5 cup [113 g]) 130   Ice cream or frozen yogurt (0.5 cup [113 g]) 100   Fortified non-dairy milks (soy, oat, almond; 8 oz [240 mL]) 300 to 450   Beans (0.5 cup cooked [113 g]) 60 to 80   Dark, leafy green vegetables (0.5 cup cooked [113 g]) 50 to 135   Almonds (24 whole) 70   Orange (1 medium) 60        Thank you for choosing [facility].   I have office hours 8:00 am to 4:30 pm on Mondays, Tuesdays, Thursdays and Fridays. I am out of the office most Wednesdays, although I do occasionally work one Wednesday per month.   Following your visit, if you had labs and diagnostic testing, once they have returned, I will review them and you will be contacted by myself or my nurse if there are concerns. You can also view the labs on [employer].   For refills, notify your pharmacy regarding what you need and the pharmacy will generate a refill request. Please plan ahead and allow 3-5 days for refill requests.  If you have an urgent/same day appointment need, please request an urgent visit when you call and our support staff will send me an Over[employer] request to try to accommodate you for the urgent visit. I like to fit in and see my  own patients and hopefully save you time too!   You can also now schedule appointments on the "[employer] santy.   In the event that you need to be seen for urgent concerns and I am out of office, please see one of my colleagues for acute concerns or you may also present to Urgent Care or the ER.  I appreciate the opportunity to serve you and look forward to supporting your healthcare needs in the future.

## 2025-04-24 NOTE — PROGRESS NOTES
Assessment & Plan     Encounter to establish care  Medical, surgical, family, and social histories discussed updated. Reviewed active healthcare problems. Medications reviewed. [facility]     Type 2 diabetes mellitus without complication, without long-term current use of insulin (H)  Has been controlled.  Will update A1c today.  Continues on Ozempic 0.5 mg.  No history of neuropathy or proteinuria.  Eye exam scheduled next month.  With next refill, she will let me know if she would like to increase to Ozempic 1 mg.  Continue to follow with diabetes center.  Follow-up 4 months.  - Basic metabolic panel; Future  - Hemoglobin A1c; Future  - Hemoglobin A1c  - Basic metabolic panel    Recurrent major depressive disorder, in full remission  Stable.  Doing well on Cymbalta twice daily.  History of past trauma but again recovered and doing well.  - DULoxetine (CYMBALTA) 60 MG capsule; Take 1 capsule (60 mg) by mouth 2 times daily.    Insomnia, unspecified type  Has been on trazodone for sleep for many years.  No side effects or concerns.  - traZODone (DESYREL) 100 MG tablet; Take 1 tablet (100 mg) by mouth at bedtime.    Hypothyroidism, unspecified type  Will update TSH today.  Continues on 225 mcg of Synthroid.  - TSH with free T4 reflex; Future  - TSH with free T4 reflex    Restless leg syndrome  Stable.  Magnesium has been beneficial.  Will update iron level today since she does have a history of gastric bypass and low iron could be exacerbating restless legs at times.  Requip refilled.  - rOPINIRole (REQUIP) 0.25 MG tablet; Take 1 tablet (0.25 mg) by mouth at bedtime.    Gastric bypass status for obesity  Will update vitamin deficiency labs, last done 1 year ago.  - Iron and iron binding capacity; Future  - Vitamin B12; Future  - Ferritin; Future  - Ferritin  - Vitamin B12  - Iron and iron binding capacity    Encounter for immunization  - Pneumococcal 20 Valent Conjugate (PCV20)  - ZOSTER RECOMBINANT  "ADJUVANTED (SHINGRIX)      BMI  Estimated body mass index is 38.49 kg/m  as calculated from the following:    Height as of this encounter: 1.645 m (5' 4.75\").    Weight as of this encounter: 104.1 kg (229 lb 8 oz).   Weight management plan: Discussed healthy diet and exercise guidelines      The longitudinal plan of care for the diagnosis(es)/condition(s) as documented were addressed during this visit. Due to the added complexity in care, I will continue to support Josephine in the subsequent management and with ongoing continuity of care.    Subjective   Josephine is a 60 year old, presenting for the following health issues:  Establish Care and Diabetes        4/24/2025     9:49 AM   Additional Questions   Roomed by Amaya DENNEY     History of Present Illness       Reason for visit:  Looking for a new primary physician. I m actually due for my A1c and TSH labs.   She is taking medications regularly.        Diabetes Follow-up    How often are you checking your blood sugar? Continuous glucose monitor. She wears dexcom for 10 dys, then off for 15 weeks and repeats  What time of day are you checking your blood sugars (select all that apply)?  Not applicable  Have you had any blood sugars above 200?  Yes, rarely  Have you had any blood sugars below 70?  No  What symptoms do you notice when your blood sugar is low?  None  What concerns do you have today about your diabetes? None. Diet changes and purposefull loss of weight. 38lb since June.    Do you have any of these symptoms? (Select all that apply)  No numbness or tingling in feet.  No redness, sores or blisters on feet.  No complaints of excessive thirst.  No reports of blurry vision.  No significant changes to weight.  Have you had a diabetic eye exam in the last 12 months? Yes- Date of last eye exam: 5/2025 upcoming,  [facility]: [facility]    Has lost 38lbs from June.   Wearing sensor occasionally.   Currently on ozempic 0.5mg     BP Readings from Last 2 Encounters:   04/24/25 " "108/72   02/10/25 109/77     No results found for: \"A1C\", \"LDL\"      Depression and Anxiety   How are you doing with your depression since your last visit? No change  How are you doing with your anxiety since your last visit?  No change  Are you having other symptoms that might be associated with depression or anxiety? No  Have you had a significant life event? OTHER: trauma past    Do you have any concerns with your use of alcohol or other drugs? No    Trazodone started years ago   Cymbalta for years.   Feeling like everything working well.   History of two other medications, weaned off     Social History     Tobacco Use    Smoking status: Never    Smokeless tobacco: Never   Substance Use Topics    Alcohol use: Yes     Comment: weekends    Drug use: No     }  Hypothyroidism Follow-up    Since last visit, patient describes the following symptoms: Weight stable, no hair loss, no skin changes, no constipation, no loose stools    Restless legs. On requip 0.25mg. taking magnesium that is helping.           Objective    /72 (BP Location: Left arm, Patient Position: Sitting, Cuff Size: Adult Large)   Pulse 104   Temp 98.5  F (36.9  C) (Tympanic)   Ht 1.645 m (5' 4.75\")   Wt 104.1 kg (229 lb 8 oz)   SpO2 96%   BMI 38.49 kg/m    Body mass index is 38.49 kg/m .  Physical Exam         No results found for any visits on 04/24/25.          Signed Electronically by: Maru Chaudhary MD    "

## 2025-04-30 DIAGNOSIS — E11.9 TYPE 2 DIABETES MELLITUS WITHOUT COMPLICATION, WITHOUT LONG-TERM CURRENT USE OF INSULIN (H): Primary | ICD-10-CM

## 2025-04-30 NOTE — TELEPHONE ENCOUNTER
OZEMPIC, 0.25 OR 0.5 MG/DOSE, 2 MG/3ML pen       Last Written Prescription Date:  pt reported   Last Fill Quantity: -,   # refills: -  Last Office Visit: 04/24/2025  Future Office visit:       Routing refill request to provider for review/approval because:  Medication is reported/historical'

## 2025-05-01 RX ORDER — SEMAGLUTIDE 0.68 MG/ML
0.5 INJECTION, SOLUTION SUBCUTANEOUS
Qty: 3 ML | Refills: 0 | Status: SHIPPED | OUTPATIENT
Start: 2025-05-01

## 2025-05-07 ENCOUNTER — TRANSFERRED RECORDS (OUTPATIENT)
Dept: HEALTH INFORMATION MANAGEMENT | Facility: CLINIC | Age: 60
End: 2025-05-07

## 2025-05-19 ENCOUNTER — MYC MEDICAL ADVICE (OUTPATIENT)
Dept: FAMILY MEDICINE | Facility: OTHER | Age: 60
End: 2025-05-19

## 2025-05-27 ENCOUNTER — LAB (OUTPATIENT)
Dept: LAB | Facility: OTHER | Age: 60
End: 2025-05-27
Payer: COMMERCIAL

## 2025-05-27 ENCOUNTER — MYC REFILL (OUTPATIENT)
Dept: FAMILY MEDICINE | Facility: OTHER | Age: 60
End: 2025-05-27

## 2025-05-27 ENCOUNTER — RESULTS FOLLOW-UP (OUTPATIENT)
Dept: FAMILY MEDICINE | Facility: OTHER | Age: 60
End: 2025-05-27

## 2025-05-27 ENCOUNTER — MYC MEDICAL ADVICE (OUTPATIENT)
Dept: FAMILY MEDICINE | Facility: OTHER | Age: 60
End: 2025-05-27

## 2025-05-27 DIAGNOSIS — E03.9 HYPOTHYROIDISM, UNSPECIFIED TYPE: ICD-10-CM

## 2025-05-27 DIAGNOSIS — E11.9 TYPE 2 DIABETES MELLITUS WITHOUT COMPLICATION, WITHOUT LONG-TERM CURRENT USE OF INSULIN (H): ICD-10-CM

## 2025-05-27 DIAGNOSIS — R79.89 ELEVATED FERRITIN: ICD-10-CM

## 2025-05-27 DIAGNOSIS — E03.9 HYPOTHYROIDISM, UNSPECIFIED TYPE: Primary | ICD-10-CM

## 2025-05-27 LAB
FERRITIN SERPL-MCNC: 329 NG/ML (ref 11–328)
IRON BINDING CAPACITY (ROCHE): 291 UG/DL (ref 240–430)
IRON SATN MFR SERPL: 42 % (ref 15–46)
IRON SERPL-MCNC: 121 UG/DL (ref 37–145)
T4 FREE SERPL-MCNC: 1.83 NG/DL (ref 0.9–1.7)
TSH SERPL DL<=0.005 MIU/L-ACNC: 0.05 UIU/ML (ref 0.3–4.2)

## 2025-05-27 PROCEDURE — 84439 ASSAY OF FREE THYROXINE: CPT

## 2025-05-27 PROCEDURE — 36415 COLL VENOUS BLD VENIPUNCTURE: CPT

## 2025-05-27 PROCEDURE — 83550 IRON BINDING TEST: CPT

## 2025-05-27 PROCEDURE — 82728 ASSAY OF FERRITIN: CPT

## 2025-05-27 PROCEDURE — 83540 ASSAY OF IRON: CPT

## 2025-05-27 PROCEDURE — 84443 ASSAY THYROID STIM HORMONE: CPT

## 2025-05-27 RX ORDER — SEMAGLUTIDE 0.68 MG/ML
0.5 INJECTION, SOLUTION SUBCUTANEOUS
Qty: 3 ML | Refills: 0 | Status: CANCELLED | OUTPATIENT
Start: 2025-05-27

## 2025-05-27 RX ORDER — LEVOTHYROXINE SODIUM 175 UG/1
175 TABLET ORAL
Qty: 60 TABLET | Refills: 0 | Status: SHIPPED | OUTPATIENT
Start: 2025-05-27

## 2025-05-28 NOTE — TELEPHONE ENCOUNTER
Per pt she had some nausea the few weeks of starting Ozempic.  Pt has had no current negative side effects and is ready to go up to the next dose.      Per pt she is not monitoring her blood sugars at this time.   Pt denies any symptoms of hypo or hyperglycemia.     Pended up Ozempic 1.0 mg per pt request.  Please review and sign if appropriate.

## 2025-05-28 NOTE — TELEPHONE ENCOUNTER
Pt called and has been taking the synthroid 200 mcg daily.      Pt was scheduled for a follow up lab appointment per PCP recommendations.

## 2025-06-25 DIAGNOSIS — E11.9 TYPE 2 DIABETES MELLITUS WITHOUT COMPLICATION, WITHOUT LONG-TERM CURRENT USE OF INSULIN (H): ICD-10-CM

## 2025-06-25 NOTE — TELEPHONE ENCOUNTER
Ozempic       Last Written Prescription Date:  5/28/2025  Last Fill Quantity: 3mL,   # refills: 0  Last Office Visit: 4/24/2025  Future Office visit:    Next 5 appointments (look out 90 days)      Aug 15, 2025 9:00 AM  (Arrive by 8:45 AM)  Adult Preventative Visit with Maru Chaudhary MD  [facility] ([facility] [place] ) [address]  [place] [address]  [phone]

## 2025-06-26 ENCOUNTER — LAB (OUTPATIENT)
Dept: LAB | Facility: OTHER | Age: 60
End: 2025-06-26
Payer: COMMERCIAL

## 2025-06-26 ENCOUNTER — RESULTS FOLLOW-UP (OUTPATIENT)
Dept: FAMILY MEDICINE | Facility: OTHER | Age: 60
End: 2025-06-26

## 2025-06-26 DIAGNOSIS — E03.9 HYPOTHYROIDISM, UNSPECIFIED TYPE: ICD-10-CM

## 2025-06-26 DIAGNOSIS — E03.9 HYPOTHYROIDISM, UNSPECIFIED TYPE: Primary | ICD-10-CM

## 2025-06-26 LAB
T4 FREE SERPL-MCNC: 1.49 NG/DL (ref 0.9–1.7)
TSH SERPL DL<=0.005 MIU/L-ACNC: 0.07 UIU/ML (ref 0.3–4.2)

## 2025-06-26 PROCEDURE — 36415 COLL VENOUS BLD VENIPUNCTURE: CPT

## 2025-06-26 PROCEDURE — 84439 ASSAY OF FREE THYROXINE: CPT

## 2025-06-26 PROCEDURE — 84443 ASSAY THYROID STIM HORMONE: CPT

## 2025-06-26 RX ORDER — SEMAGLUTIDE 1.34 MG/ML
INJECTION, SOLUTION SUBCUTANEOUS
Qty: 3 ML | Refills: 0 | Status: SHIPPED | OUTPATIENT
Start: 2025-06-26

## 2025-06-26 RX ORDER — LEVOTHYROXINE SODIUM 150 UG/1
150 TABLET ORAL
Qty: 60 TABLET | Refills: 0 | Status: SHIPPED | OUTPATIENT
Start: 2025-06-26 | End: 2025-07-25

## 2025-07-23 DIAGNOSIS — E11.9 TYPE 2 DIABETES MELLITUS WITHOUT COMPLICATION, WITHOUT LONG-TERM CURRENT USE OF INSULIN (H): ICD-10-CM

## 2025-07-24 ENCOUNTER — LAB (OUTPATIENT)
Dept: LAB | Facility: OTHER | Age: 60
End: 2025-07-24
Payer: COMMERCIAL

## 2025-07-24 DIAGNOSIS — E03.9 HYPOTHYROIDISM, UNSPECIFIED TYPE: ICD-10-CM

## 2025-07-24 LAB
T4 FREE SERPL-MCNC: 1.57 NG/DL (ref 0.9–1.7)
TSH SERPL DL<=0.005 MIU/L-ACNC: 0.19 UIU/ML (ref 0.3–4.2)

## 2025-07-24 PROCEDURE — 84439 ASSAY OF FREE THYROXINE: CPT

## 2025-07-24 PROCEDURE — 36415 COLL VENOUS BLD VENIPUNCTURE: CPT

## 2025-07-24 PROCEDURE — 84443 ASSAY THYROID STIM HORMONE: CPT

## 2025-07-24 RX ORDER — SEMAGLUTIDE 1.34 MG/ML
INJECTION, SOLUTION SUBCUTANEOUS
Qty: 3 ML | Refills: 0 | Status: SHIPPED | OUTPATIENT
Start: 2025-07-24

## 2025-08-03 ENCOUNTER — HEALTH MAINTENANCE LETTER (OUTPATIENT)
Age: 60
End: 2025-08-03

## 2025-08-15 ENCOUNTER — LAB (OUTPATIENT)
Dept: LAB | Facility: OTHER | Age: 60
End: 2025-08-15
Payer: COMMERCIAL

## 2025-08-15 ENCOUNTER — RESULTS FOLLOW-UP (OUTPATIENT)
Dept: FAMILY MEDICINE | Facility: OTHER | Age: 60
End: 2025-08-15

## 2025-08-15 DIAGNOSIS — E11.9 TYPE 2 DIABETES MELLITUS WITHOUT COMPLICATION, WITHOUT LONG-TERM CURRENT USE OF INSULIN (H): ICD-10-CM

## 2025-08-15 DIAGNOSIS — E53.8 LOW FOLATE: Primary | ICD-10-CM

## 2025-08-15 DIAGNOSIS — R79.89 HIGH SERUM VITAMIN D: ICD-10-CM

## 2025-08-15 DIAGNOSIS — Z98.84 GASTRIC BYPASS STATUS FOR OBESITY: Chronic | ICD-10-CM

## 2025-08-15 DIAGNOSIS — E03.9 HYPOTHYROIDISM, UNSPECIFIED TYPE: Chronic | ICD-10-CM

## 2025-08-15 LAB
ALBUMIN SERPL BCG-MCNC: 4.1 G/DL (ref 3.5–5.2)
ALP SERPL-CCNC: 177 U/L (ref 40–150)
ALT SERPL W P-5'-P-CCNC: 96 U/L (ref 0–50)
ANION GAP SERPL CALCULATED.3IONS-SCNC: 11 MMOL/L (ref 7–15)
AST SERPL W P-5'-P-CCNC: 123 U/L (ref 0–45)
BASOPHILS # BLD AUTO: 0.03 10E3/UL (ref 0–0.2)
BASOPHILS NFR BLD AUTO: 0.4 %
BILIRUB SERPL-MCNC: 0.4 MG/DL
BUN SERPL-MCNC: 12 MG/DL (ref 8–23)
CALCIUM SERPL-MCNC: 9.2 MG/DL (ref 8.8–10.4)
CHLORIDE SERPL-SCNC: 105 MMOL/L (ref 98–107)
CHOLEST SERPL-MCNC: 165 MG/DL
CREAT SERPL-MCNC: 0.79 MG/DL (ref 0.51–0.95)
EGFRCR SERPLBLD CKD-EPI 2021: 85 ML/MIN/1.73M2
EOSINOPHIL # BLD AUTO: 0.17 10E3/UL (ref 0–0.7)
EOSINOPHIL NFR BLD AUTO: 2.4 %
ERYTHROCYTE [DISTWIDTH] IN BLOOD BY AUTOMATED COUNT: 13.1 % (ref 10–15)
EST. AVERAGE GLUCOSE BLD GHB EST-MCNC: 114 MG/DL
FASTING STATUS PATIENT QL REPORTED: YES
FASTING STATUS PATIENT QL REPORTED: YES
FERRITIN SERPL-MCNC: 101 NG/ML (ref 11–328)
FOLATE SERPL-MCNC: 4.4 NG/ML (ref 4.6–34.8)
GLUCOSE SERPL-MCNC: 125 MG/DL (ref 70–99)
HBA1C MFR BLD: 5.6 %
HCO3 SERPL-SCNC: 24 MMOL/L (ref 22–29)
HCT VFR BLD AUTO: 41.9 % (ref 35–47)
HDLC SERPL-MCNC: 68 MG/DL
HGB BLD-MCNC: 13.8 G/DL (ref 11.7–15.7)
IMM GRANULOCYTES # BLD: 0.03 10E3/UL
IMM GRANULOCYTES NFR BLD: 0.4 %
IRON BINDING CAPACITY (ROCHE): 324 UG/DL (ref 240–430)
IRON SATN MFR SERPL: 36 % (ref 15–46)
IRON SERPL-MCNC: 116 UG/DL (ref 37–145)
LDLC SERPL CALC-MCNC: 81 MG/DL
LYMPHOCYTES # BLD AUTO: 1.67 10E3/UL (ref 0.8–5.3)
LYMPHOCYTES NFR BLD AUTO: 23.5 %
MCH RBC QN AUTO: 32.2 PG (ref 26.5–33)
MCHC RBC AUTO-ENTMCNC: 32.9 G/DL (ref 31.5–36.5)
MCV RBC AUTO: 97.9 FL (ref 78–100)
MONOCYTES # BLD AUTO: 0.64 10E3/UL (ref 0–1.3)
MONOCYTES NFR BLD AUTO: 9 %
NEUTROPHILS # BLD AUTO: 4.57 10E3/UL (ref 1.6–8.3)
NEUTROPHILS NFR BLD AUTO: 64.3 %
NONHDLC SERPL-MCNC: 97 MG/DL
NRBC # BLD AUTO: <0.03 10E3/UL
NRBC BLD AUTO-RTO: 0 /100
PLATELET # BLD AUTO: 314 10E3/UL (ref 150–450)
POTASSIUM SERPL-SCNC: 4.4 MMOL/L (ref 3.4–5.3)
PROT SERPL-MCNC: 6.6 G/DL (ref 6.4–8.3)
RBC # BLD AUTO: 4.28 10E6/UL (ref 3.8–5.2)
SODIUM SERPL-SCNC: 140 MMOL/L (ref 135–145)
TRIGL SERPL-MCNC: 82 MG/DL
TSH SERPL DL<=0.005 MIU/L-ACNC: 1.33 UIU/ML (ref 0.3–4.2)
VIT B12 SERPL-MCNC: 1046 PG/ML (ref 232–1245)
VIT D+METAB SERPL-MCNC: 89 NG/ML (ref 20–50)
WBC # BLD AUTO: 7.11 10E3/UL (ref 4–11)

## 2025-08-15 PROCEDURE — 82746 ASSAY OF FOLIC ACID SERUM: CPT

## 2025-08-15 PROCEDURE — 36415 COLL VENOUS BLD VENIPUNCTURE: CPT

## 2025-08-15 PROCEDURE — 83036 HEMOGLOBIN GLYCOSYLATED A1C: CPT

## 2025-08-15 PROCEDURE — 82306 VITAMIN D 25 HYDROXY: CPT

## 2025-08-15 PROCEDURE — 82728 ASSAY OF FERRITIN: CPT

## 2025-08-15 PROCEDURE — 80050 GENERAL HEALTH PANEL: CPT

## 2025-08-15 PROCEDURE — 83540 ASSAY OF IRON: CPT

## 2025-08-15 PROCEDURE — 82607 VITAMIN B-12: CPT

## 2025-08-15 PROCEDURE — 83550 IRON BINDING TEST: CPT

## 2025-08-15 PROCEDURE — 3044F HG A1C LEVEL LT 7.0%: CPT

## 2025-08-15 PROCEDURE — 3048F LDL-C <100 MG/DL: CPT

## 2025-08-15 PROCEDURE — 80061 LIPID PANEL: CPT

## 2025-08-18 RX ORDER — FOLIC ACID 1 MG/1
1 TABLET ORAL DAILY
Qty: 90 TABLET | Refills: 1 | Status: SHIPPED | OUTPATIENT
Start: 2025-08-18

## 2025-08-19 ENCOUNTER — TELEPHONE (OUTPATIENT)
Dept: FAMILY MEDICINE | Facility: OTHER | Age: 60
End: 2025-08-19

## 2025-08-22 ENCOUNTER — LAB (OUTPATIENT)
Dept: LAB | Facility: OTHER | Age: 60
End: 2025-08-22
Payer: COMMERCIAL

## 2025-08-22 DIAGNOSIS — R74.01 TRANSAMINITIS: ICD-10-CM

## 2025-08-22 DIAGNOSIS — R76.8 POSITIVE ANA (ANTINUCLEAR ANTIBODY): ICD-10-CM

## 2025-08-22 DIAGNOSIS — E03.9 HYPOTHYROIDISM, UNSPECIFIED TYPE: ICD-10-CM

## 2025-08-22 LAB
ALBUMIN SERPL BCG-MCNC: 4 G/DL (ref 3.5–5.2)
ALP SERPL-CCNC: 137 U/L (ref 40–150)
ALT SERPL W P-5'-P-CCNC: 31 U/L (ref 0–50)
AST SERPL W P-5'-P-CCNC: 32 U/L (ref 0–45)
BILIRUB DIRECT SERPL-MCNC: 0.1 MG/DL (ref 0–0.3)
BILIRUB SERPL-MCNC: 0.3 MG/DL
HBV CORE AB SERPL QL IA: NONREACTIVE
HBV SURFACE AB SERPL IA-ACNC: <3.5 M[IU]/ML
HBV SURFACE AB SERPL IA-ACNC: NONREACTIVE M[IU]/ML
HBV SURFACE AG SERPL QL IA: NONREACTIVE
HCV AB SERPL QL IA: NONREACTIVE
INR PPP: 1.03 (ref 0.85–1.15)
PROT SERPL-MCNC: 6.4 G/DL (ref 6.4–8.3)
PROTHROMBIN TIME: 13.6 SECONDS (ref 11.8–14.8)
TSH SERPL DL<=0.005 MIU/L-ACNC: 0.71 UIU/ML (ref 0.3–4.2)

## 2025-08-22 PROCEDURE — 82103 ALPHA-1-ANTITRYPSIN TOTAL: CPT | Mod: 90

## 2025-08-22 PROCEDURE — 82104 ALPHA-1-ANTITRYPSIN PHENO: CPT | Mod: 90

## 2025-08-22 PROCEDURE — 86704 HEP B CORE ANTIBODY TOTAL: CPT

## 2025-08-22 PROCEDURE — 36415 COLL VENOUS BLD VENIPUNCTURE: CPT

## 2025-08-22 PROCEDURE — 86140 C-REACTIVE PROTEIN: CPT

## 2025-08-22 PROCEDURE — 87340 HEPATITIS B SURFACE AG IA: CPT

## 2025-08-22 PROCEDURE — 85610 PROTHROMBIN TIME: CPT

## 2025-08-22 PROCEDURE — 86706 HEP B SURFACE ANTIBODY: CPT

## 2025-08-22 PROCEDURE — 86038 ANTINUCLEAR ANTIBODIES: CPT

## 2025-08-22 PROCEDURE — 80076 HEPATIC FUNCTION PANEL: CPT

## 2025-08-22 PROCEDURE — 86364 TISS TRNSGLTMNASE EA IG CLAS: CPT

## 2025-08-22 PROCEDURE — 86803 HEPATITIS C AB TEST: CPT

## 2025-08-22 PROCEDURE — 86039 ANTINUCLEAR ANTIBODIES (ANA): CPT

## 2025-08-25 LAB
TTG IGA SER-ACNC: <0.2 U/ML
TTG IGG SER-ACNC: <0.6 U/ML

## 2025-08-26 LAB
A1AT PHENOTYP SERPL-IMP: NORMAL
A1AT SERPL-MCNC: 140 MG/DL
ANA PAT SER IF-IMP: ABNORMAL
ANA SER QL IF: POSITIVE
ANA TITR SER IF: ABNORMAL {TITER}
CRP SERPL-MCNC: <3 MG/L
ENA SM IGG SER IA-ACNC: <0.7 U/ML
ENA SM IGG SER IA-ACNC: NEGATIVE
ENA SS-A AB SER IA-ACNC: <0.5 U/ML
ENA SS-A AB SER IA-ACNC: NEGATIVE
ENA SS-B IGG SER IA-ACNC: <0.6 U/ML
ENA SS-B IGG SER IA-ACNC: NEGATIVE
U1 SNRNP IGG SER IA-ACNC: <1.1 U/ML
U1 SNRNP IGG SER IA-ACNC: NEGATIVE

## 2025-08-27 LAB — DSDNA AB SER-ACNC: 0.8 IU/ML
